# Patient Record
Sex: FEMALE | Race: WHITE | NOT HISPANIC OR LATINO | Employment: OTHER | URBAN - METROPOLITAN AREA
[De-identification: names, ages, dates, MRNs, and addresses within clinical notes are randomized per-mention and may not be internally consistent; named-entity substitution may affect disease eponyms.]

---

## 2017-10-25 ENCOUNTER — TRANSCRIBE ORDERS (OUTPATIENT)
Dept: ADMINISTRATIVE | Facility: HOSPITAL | Age: 57
End: 2017-10-25

## 2017-10-25 DIAGNOSIS — Z12.31 VISIT FOR SCREENING MAMMOGRAM: Primary | ICD-10-CM

## 2017-10-27 ENCOUNTER — HOSPITAL ENCOUNTER (OUTPATIENT)
Dept: MAMMOGRAPHY | Facility: HOSPITAL | Age: 57
Discharge: HOME | End: 2017-10-27
Admitting: FAMILY MEDICINE

## 2017-10-27 DIAGNOSIS — Z12.31 VISIT FOR SCREENING MAMMOGRAM: ICD-10-CM

## 2017-10-27 PROCEDURE — G0202 SCR MAMMO BI INCL CAD: HCPCS

## 2018-11-01 ENCOUNTER — TRANSCRIBE ORDERS (OUTPATIENT)
Dept: ADMINISTRATIVE | Facility: HOSPITAL | Age: 58
End: 2018-11-01

## 2018-11-01 DIAGNOSIS — Z12.39 SCREENING BREAST EXAMINATION: Primary | ICD-10-CM

## 2018-11-02 ENCOUNTER — HOSPITAL ENCOUNTER (OUTPATIENT)
Dept: MAMMOGRAPHY | Facility: HOSPITAL | Age: 58
Discharge: HOME | End: 2018-11-02
Admitting: FAMILY MEDICINE

## 2018-11-02 DIAGNOSIS — Z12.39 SCREENING BREAST EXAMINATION: ICD-10-CM

## 2018-11-02 PROCEDURE — 77067 SCR MAMMO BI INCL CAD: CPT

## 2019-09-10 NOTE — PROGRESS NOTES
Subjective   Chief Complaint   Patient presents with   • Annual Exam       History of Present Illness     60 yo wf presents as a new patient to establish care and have her physical. She is a former patient of Dr. Cabello but she is now out of network.     She has been well and is without complaints. She uses Nasacort as needed which is typically when she is outside all day; she has her allergy shot every five weeks. She has seen Dr. Smith for this for years.     She reports that she has had her C-scope; she would prefer Cologuard as she was sick for days with C-scope. She also has her P/P and BR exam with Dr. Cabello. She denies abnormal PAP; her last one was 2 years ago. She reports that her tetanus is UTD and she has had both Havrix and Zostavax.     She has a history of narrow angle glaucoma for which she had iridotomy with Dr. Samspon 3 years ago. She sees her dentist Dr. Barrera regularly.      There is no problem list on file for this patient.      No Known Allergies    Current Outpatient Medications on File Prior to Visit   Medication Sig Dispense Refill   • glucosamine-chondroitin 500-400 MG capsule capsule Take 1 capsule by mouth Daily.     • guaiFENesin (MUCINEX) 600 MG 12 hr tablet Take 1,200 mg by mouth 2 (Two) Times a Day.     • Multiple Vitamins-Minerals (WOMENS MULTI VITAMIN & MINERAL PO) Take 1 tablet by mouth Daily.     • Multiple Vitamins-Minerals (ZINC PO) Take 1 capsule by mouth Daily. During the winter cheilitis.     • PSEUDOEPHEDRINE HCL ER PO Take 120 mg by mouth Every 12 (Twelve) Hours.     • Triamcinolone Acetonide (NASACORT ALLERGY 24HR) 55 MCG/ACT nasal inhaler 2 sprays into each nostril Daily.     • TURMERIC PO Take 1 capsule by mouth Daily.     • VITAMIN E PO Take 1 capsule by mouth Daily. Uses only in the winter for cheilitis.       No current facility-administered medications on file prior to visit.        Past Medical History:   Diagnosis Date   • Allergic    • Seasonal allergies   "      Family History   Problem Relation Age of Onset   • Hypertension Mother    • Diabetes Father        Social History     Socioeconomic History   • Marital status: Single     Spouse name: Not on file   • Number of children: Not on file   • Years of education: Not on file   • Highest education level: Not on file   Tobacco Use   • Smoking status: Never Smoker   • Smokeless tobacco: Never Used   Substance and Sexual Activity   • Alcohol use: No   • Drug use: Defer   • Sexual activity: Defer       Past Surgical History:   Procedure Laterality Date   • ANKLE SURGERY     • COLONOSCOPY     • EYE PERIPHERAL IRIDOTOMY Bilateral 2016    Dr. Stout Sampson   • FINGER SURGERY         The following portions of the patient's history were reviewed and updated as appropriate: problem list, allergies, current medications, past medical history, past family history, past social history and past surgical history.    Review of Systems   Constitutional: Negative for fatigue.   HENT: Negative for congestion.    Eyes: Negative for visual disturbance.   Respiratory: Negative for shortness of breath.    Cardiovascular: Negative for chest pain.   Gastrointestinal: Negative for blood in stool.   Endocrine: Negative.    Genitourinary: Negative.    Musculoskeletal: Negative for arthralgias.        Pain in right thumb and right hand.    Skin: Negative for rash.   Allergic/Immunologic: Negative for environmental allergies.   Neurological: Negative for headache.   Hematological: Does not bruise/bleed easily.   Psychiatric/Behavioral: The patient is not nervous/anxious.        Immunization History   Administered Date(s) Administered   • Hepatitis A 04/15/2018, 10/15/2018   • Tdap 10/01/2015   • Zostavax 10/01/2016       Objective   Vitals:    09/12/19 0958 09/12/19 1042   BP:  120/80   Pulse:  72   Resp:  12   Weight: 75.3 kg (166 lb)    Height: 170.2 cm (67\")      Physical Exam   Constitutional: She is oriented to person, place, and time. She " appears well-developed and well-nourished.   HENT:   Head: Normocephalic and atraumatic.   Right Ear: External ear normal.   Left Ear: External ear normal.   Nose: Nose normal.   Mouth/Throat: Oropharynx is clear and moist.   Eyes: Conjunctivae and EOM are normal. Pupils are equal, round, and reactive to light. No scleral icterus.   Neck: Neck supple. Carotid bruit is not present. No thyromegaly present.   Cardiovascular: Normal rate, regular rhythm, normal heart sounds and intact distal pulses.   No murmur heard.  Pulmonary/Chest: Effort normal and breath sounds normal.   Abdominal: Soft. Bowel sounds are normal. She exhibits no distension and no mass. There is no hepatosplenomegaly. There is no tenderness. There is no rebound.   Musculoskeletal:   Ambulates without assistance; no clubbing, cyanosis or edema.    Lymphadenopathy:     She has no cervical adenopathy.   Neurological: She is alert and oriented to person, place, and time.   Skin: Skin is warm and dry.   Psychiatric: She has a normal mood and affect.   Vitals reviewed.      Procedures    Assessment/Plan   Yun was seen today for annual exam.    Diagnoses and all orders for this visit:    Annual physical exam  Comments:  Will schedule P/P after I have reviewed Dr. Cabello's records. Flu shot today. She exercises regularly.     Breast cancer screening  Comments:  Due in November. SBE monthly.   Orders:  -     Mammo Screening Bilateral With CAD; Future    Need for immunization against influenza  Comments:  Today.   Orders:  -     Flucelvax Quad=>4Years ([id number])    Pain of right thumb  Comments:  New problem; imaging showed arthritic changes. Will have her see Dr. Healy.   Orders:  -     Ambulatory Referral to Hand Surgery    Right hand pain  Comments:  New problem; imaging showed arthritic changes. Will have her see Dr. Healy.  Orders:  -     Ambulatory Referral to Hand Surgery    Healthcare maintenance  Comments:  CMP and FLP today.   Orders:  -      Comprehensive Metabolic Panel  -     Lipid Panel With / Chol / HDL Ratio    Records requested from Dr. Cabello.     Have reviewed labs dated 09/13/18 patient has brought with her today as well as her last MMG from 11/2018.     Return in about 1 year (around 9/12/2020).

## 2019-09-12 ENCOUNTER — OFFICE VISIT (OUTPATIENT)
Dept: INTERNAL MEDICINE | Facility: CLINIC | Age: 59
End: 2019-09-12

## 2019-09-12 VITALS
RESPIRATION RATE: 12 BRPM | BODY MASS INDEX: 26.06 KG/M2 | HEART RATE: 72 BPM | DIASTOLIC BLOOD PRESSURE: 80 MMHG | SYSTOLIC BLOOD PRESSURE: 120 MMHG | HEIGHT: 67 IN | WEIGHT: 166 LBS

## 2019-09-12 DIAGNOSIS — Z00.00 HEALTHCARE MAINTENANCE: ICD-10-CM

## 2019-09-12 DIAGNOSIS — M79.644 PAIN OF RIGHT THUMB: ICD-10-CM

## 2019-09-12 DIAGNOSIS — Z00.00 ANNUAL PHYSICAL EXAM: Primary | ICD-10-CM

## 2019-09-12 DIAGNOSIS — Z23 NEED FOR IMMUNIZATION AGAINST INFLUENZA: ICD-10-CM

## 2019-09-12 DIAGNOSIS — Z12.39 BREAST CANCER SCREENING: ICD-10-CM

## 2019-09-12 DIAGNOSIS — M79.641 RIGHT HAND PAIN: ICD-10-CM

## 2019-09-12 LAB
ALBUMIN SERPL-MCNC: 5 G/DL (ref 3.5–5.2)
ALBUMIN/GLOB SERPL: 1.9 G/DL
ALP SERPL-CCNC: 101 U/L (ref 39–117)
ALT SERPL-CCNC: 18 U/L (ref 1–33)
AST SERPL-CCNC: 25 U/L (ref 1–32)
BILIRUB SERPL-MCNC: 0.4 MG/DL (ref 0.2–1.2)
BUN SERPL-MCNC: 15 MG/DL (ref 8–23)
BUN/CREAT SERPL: 17.6 (ref 7–25)
CALCIUM SERPL-MCNC: 9.8 MG/DL (ref 8.6–10.5)
CHLORIDE SERPL-SCNC: 99 MMOL/L (ref 98–107)
CHOLEST SERPL-MCNC: 204 MG/DL (ref 0–200)
CHOLEST/HDLC SERPL: 2.43 {RATIO}
CO2 SERPL-SCNC: 28.1 MMOL/L (ref 22–29)
CREAT SERPL-MCNC: 0.85 MG/DL (ref 0.57–1)
GLOBULIN SER CALC-MCNC: 2.7 GM/DL
GLUCOSE SERPL-MCNC: 101 MG/DL (ref 65–99)
HDLC SERPL-MCNC: 84 MG/DL (ref 40–60)
LDLC SERPL CALC-MCNC: 97 MG/DL (ref 0–100)
POTASSIUM SERPL-SCNC: 4.5 MMOL/L (ref 3.5–5.2)
PROT SERPL-MCNC: 7.7 G/DL (ref 6–8.5)
SODIUM SERPL-SCNC: 140 MMOL/L (ref 136–145)
TRIGL SERPL-MCNC: 116 MG/DL (ref 0–150)
VLDLC SERPL CALC-MCNC: 23.2 MG/DL

## 2019-09-12 PROCEDURE — 90674 CCIIV4 VAC NO PRSV 0.5 ML IM: CPT | Performed by: NURSE PRACTITIONER

## 2019-09-12 PROCEDURE — 90471 IMMUNIZATION ADMIN: CPT | Performed by: NURSE PRACTITIONER

## 2019-09-12 PROCEDURE — 99386 PREV VISIT NEW AGE 40-64: CPT | Performed by: NURSE PRACTITIONER

## 2019-09-12 RX ORDER — SODIUM PHOSPHATE,MONO-DIBASIC 19G-7G/118
1 ENEMA (ML) RECTAL DAILY
COMMUNITY

## 2019-09-12 RX ORDER — GUAIFENESIN 600 MG/1
1200 TABLET, EXTENDED RELEASE ORAL 2 TIMES DAILY
COMMUNITY

## 2019-11-04 ENCOUNTER — HOSPITAL ENCOUNTER (OUTPATIENT)
Dept: MAMMOGRAPHY | Facility: HOSPITAL | Age: 59
Discharge: HOME | End: 2019-11-04
Admitting: NURSE PRACTITIONER

## 2019-11-04 DIAGNOSIS — Z12.39 BREAST CANCER SCREENING: ICD-10-CM

## 2019-11-04 PROCEDURE — 77067 SCR MAMMO BI INCL CAD: CPT

## 2020-09-10 DIAGNOSIS — Z00.00 HEALTHCARE MAINTENANCE: Primary | ICD-10-CM

## 2020-09-11 DIAGNOSIS — Z00.00 HEALTHCARE MAINTENANCE: Primary | ICD-10-CM

## 2020-09-12 LAB
ALBUMIN SERPL-MCNC: 4.9 G/DL (ref 3.5–5.2)
ALBUMIN/GLOB SERPL: 2.5 G/DL
ALP SERPL-CCNC: 106 U/L (ref 39–117)
ALT SERPL-CCNC: 20 U/L (ref 1–33)
AST SERPL-CCNC: 24 U/L (ref 1–32)
BILIRUB SERPL-MCNC: 0.5 MG/DL (ref 0–1.2)
BUN SERPL-MCNC: 17 MG/DL (ref 8–23)
BUN/CREAT SERPL: 21 (ref 7–25)
CALCIUM SERPL-MCNC: 9.5 MG/DL (ref 8.6–10.5)
CHLORIDE SERPL-SCNC: 103 MMOL/L (ref 98–107)
CHOLEST SERPL-MCNC: 211 MG/DL (ref 0–200)
CHOLEST/HDLC SERPL: 2.37 {RATIO}
CO2 SERPL-SCNC: 27.7 MMOL/L (ref 22–29)
CREAT SERPL-MCNC: 0.81 MG/DL (ref 0.57–1)
GLOBULIN SER CALC-MCNC: 2 GM/DL
GLUCOSE SERPL-MCNC: 96 MG/DL (ref 65–99)
HCV AB S/CO SERPL IA: 0.1 S/CO RATIO (ref 0–0.9)
HDLC SERPL-MCNC: 89 MG/DL (ref 40–60)
LDLC SERPL CALC-MCNC: 108 MG/DL (ref 0–100)
POTASSIUM SERPL-SCNC: 4.2 MMOL/L (ref 3.5–5.2)
PROT SERPL-MCNC: 6.9 G/DL (ref 6–8.5)
SODIUM SERPL-SCNC: 140 MMOL/L (ref 136–145)
TRIGL SERPL-MCNC: 70 MG/DL (ref 0–150)
VLDLC SERPL CALC-MCNC: 14 MG/DL

## 2020-09-18 ENCOUNTER — RESULTS ENCOUNTER (OUTPATIENT)
Dept: INTERNAL MEDICINE | Facility: CLINIC | Age: 60
End: 2020-09-18

## 2020-09-18 ENCOUNTER — OFFICE VISIT (OUTPATIENT)
Dept: INTERNAL MEDICINE | Facility: CLINIC | Age: 60
End: 2020-09-18

## 2020-09-18 VITALS
SYSTOLIC BLOOD PRESSURE: 112 MMHG | WEIGHT: 161 LBS | TEMPERATURE: 98 F | DIASTOLIC BLOOD PRESSURE: 70 MMHG | RESPIRATION RATE: 12 BRPM | BODY MASS INDEX: 25.27 KG/M2 | HEART RATE: 80 BPM | HEIGHT: 67 IN

## 2020-09-18 DIAGNOSIS — Z00.00 ANNUAL PHYSICAL EXAM: Primary | ICD-10-CM

## 2020-09-18 DIAGNOSIS — Z23 NEED FOR INFLUENZA VACCINATION: Primary | ICD-10-CM

## 2020-09-18 DIAGNOSIS — Z12.39 BREAST CANCER SCREENING: ICD-10-CM

## 2020-09-18 DIAGNOSIS — Z00.00 HEALTHCARE MAINTENANCE: ICD-10-CM

## 2020-09-18 DIAGNOSIS — Z12.11 COLON CANCER SCREENING: ICD-10-CM

## 2020-09-18 PROCEDURE — 99396 PREV VISIT EST AGE 40-64: CPT | Performed by: NURSE PRACTITIONER

## 2020-09-18 PROCEDURE — 90686 IIV4 VACC NO PRSV 0.5 ML IM: CPT | Performed by: NURSE PRACTITIONER

## 2020-09-18 PROCEDURE — 90471 IMMUNIZATION ADMIN: CPT | Performed by: NURSE PRACTITIONER

## 2020-09-18 RX ORDER — ZINC GLUCONATE 50 MG
TABLET ORAL
COMMUNITY

## 2020-09-18 NOTE — PROGRESS NOTES
Subjective   Chief Complaint   Patient presents with   • Annual Exam       History of Present Illness   60 y.o. female presents for annual physical. Feeling well without complaints. Has allergy shots every 5 weeks now with Dr. Dempsey.     Had a colonoscopy in the past at [facility] but not sure when or with whom. Reports it was negative. Interested in Cologard. Denies FH CRC. No hematochezia or melena.      Last P/P with Dr. Cabello in the past--PAPs have always been normal.     Physically active during the year.     Sees Dr. Sampson annually regarding her glaucoma.      There is no problem list on file for this patient.      No Known Allergies    Current Outpatient Medications on File Prior to Visit   Medication Sig Dispense Refill   • glucosamine-chondroitin 500-400 MG capsule capsule Take 1 capsule by mouth Daily.     • guaiFENesin (MUCINEX) 600 MG 12 hr tablet Take 1,200 mg by mouth 2 (Two) Times a Day.     • Multiple Vitamins-Minerals (WOMENS MULTI VITAMIN & MINERAL PO) Take 1 tablet by mouth Daily.     • Multiple Vitamins-Minerals (ZINC PO) Take 1 capsule by mouth Daily. During the winter cheilitis.     • PSEUDOEPHEDRINE HCL ER PO Take 120 mg by mouth Every 12 (Twelve) Hours.     • Triamcinolone Acetonide (NASACORT ALLERGY 24HR) 55 MCG/ACT nasal inhaler 2 sprays into each nostril Daily.     • TURMERIC PO Take 1 capsule by mouth Daily.     • VITAMIN E PO Take 1 capsule by mouth Daily. Uses only in the winter for cheilitis.     • Zinc 50 MG tablet Take  by mouth.       No current facility-administered medications on file prior to visit.        Past Medical History:   Diagnosis Date   • Allergic    • Seasonal allergies        Family History   Problem Relation Age of Onset   • Hypertension Mother    • Diabetes Father    • Breast cancer Neg Hx        Social History     Socioeconomic History   • Marital status: Single     Spouse name: Not on file   • Number of children: Not on file   • Years of education: Not on file   •  "Highest education level: Not on file   Tobacco Use   • Smoking status: Never Smoker   • Smokeless tobacco: Never Used   Substance and Sexual Activity   • Alcohol use: No   • Drug use: Not Currently   • Sexual activity: Not Currently       Past Surgical History:   Procedure Laterality Date   • ANKLE SURGERY     • COLONOSCOPY     • EYE PERIPHERAL IRIDOTOMY Bilateral 2016    Dr. Girish Sampson   • FINGER SURGERY         The following portions of the patient's history were reviewed and updated as appropriate: problem list, allergies, current medications, past medical history, past family history, past social history and past surgical history.    Review of Systems   Constitutional: Negative for fatigue.   HENT: Negative for congestion.    Eyes: Negative for visual disturbance.   Respiratory: Negative for shortness of breath.    Cardiovascular: Negative for chest pain.   Gastrointestinal: Negative for blood in stool.   Endocrine: Negative.    Genitourinary: Negative.    Musculoskeletal: Negative for arthralgias.   Skin: Negative for rash.   Allergic/Immunologic: Negative for environmental allergies.   Neurological: Negative for headache.   Hematological: Does not bruise/bleed easily.   Psychiatric/Behavioral: The patient is not nervous/anxious.        Immunization History   Administered Date(s) Administered   • Flulaval/Fluarix Quad 09/18/2020   • Hep A, 2 Dose 03/15/2018, 09/18/2018   • Hepatitis A 04/15/2018, 10/15/2018   • Influenza, Unspecified 09/12/2019   • Shingrix 10/17/2019, 12/22/2019   • Tdap 10/01/2015   • Zostavax 10/01/2016   • flucelvax quad pfs =>4 YRS 09/12/2019       Objective   Vitals:    09/18/20 0903 09/18/20 0930   BP:  112/70   Pulse:  80   Resp:  12   Temp: 98 °F (36.7 °C)    Weight: 73 kg (161 lb)    Height: 170.2 cm (67\")      Body mass index is 25.22 kg/m².  Physical Exam  Vitals signs reviewed.   Constitutional:       Appearance: Normal appearance. She is well-developed and normal weight.   HENT: "      Head: Normocephalic and atraumatic.      Right Ear: Tympanic membrane, ear canal and external ear normal.      Left Ear: Tympanic membrane, ear canal and external ear normal.      Nose: Nose normal.      Mouth/Throat:      Mouth: Mucous membranes are moist.      Pharynx: Oropharynx is clear. No posterior oropharyngeal erythema.   Eyes:      General: No scleral icterus.     Extraocular Movements: Extraocular movements intact.      Conjunctiva/sclera: Conjunctivae normal.      Pupils: Pupils are equal, round, and reactive to light.   Neck:      Musculoskeletal: Neck supple.      Thyroid: No thyromegaly.      Vascular: No carotid bruit.   Cardiovascular:      Rate and Rhythm: Normal rate and regular rhythm.      Pulses: Normal pulses.      Heart sounds: Normal heart sounds. No murmur.   Pulmonary:      Effort: Pulmonary effort is normal.      Breath sounds: Normal breath sounds.   Abdominal:      General: Abdomen is flat. Bowel sounds are normal. There is no distension.      Palpations: Abdomen is soft. There is no mass.      Tenderness: There is no abdominal tenderness. There is no rebound.   Musculoskeletal:      Comments: Ambulates without assistance; no clubbing, cyanosis or edema.    Lymphadenopathy:      Cervical: No cervical adenopathy.   Skin:     General: Skin is warm and dry.   Neurological:      Mental Status: She is alert. Mental status is at baseline.   Psychiatric:         Mood and Affect: Mood normal.         Behavior: Behavior normal.         Procedures    Assessment/Plan   Yun was seen today for annual exam.    Diagnoses and all orders for this visit:    Annual physical exam  Comments:  Flu shot today. Weight loss via dietary changes and 150 minutes aerobic activity.   Orders:  -     Comprehensive Metabolic Panel; Future  -     Lipid Panel With / Chol / HDL Ratio; Future    Breast cancer screening  -     Mammo Screening Bilateral With CAD; Future    Colon cancer screening  Comments:  Prefers  COlogard. Will obtain copy of last colonoscopy.   Orders:  -     Cologuard - Stool, Per Rectum; Future    Healthcare maintenance  Comments:  Schedule P/P.     Records reviewed include previous OV with myself as well as labs.     Return in about 6 months (around 3/18/2021) for pap/pelvic.

## 2020-10-06 ENCOUNTER — TELEPHONE (OUTPATIENT)
Dept: INTERNAL MEDICINE | Facility: CLINIC | Age: 60
End: 2020-10-06

## 2020-10-06 NOTE — TELEPHONE ENCOUNTER
Can you scan it in and send it to me so I can call patient? I believe it but want to confirm before we call her with it.

## 2020-10-07 DIAGNOSIS — R19.5 POSITIVE COLORECTAL CANCER SCREENING USING COLOGUARD TEST: Primary | ICD-10-CM

## 2020-10-08 ENCOUNTER — TELEPHONE (OUTPATIENT)
Dept: INTERNAL MEDICINE | Facility: CLINIC | Age: 60
End: 2020-10-08

## 2020-10-08 NOTE — TELEPHONE ENCOUNTER
Patient called in and stated she wanted to know were she got her last colonoscopy . Patient thought it was at [facility] but it was at [facility] on 4/23/2012  And it was normal .             Patient call back [phone]

## 2020-10-08 NOTE — TELEPHONE ENCOUNTER
JENELLEI      PATIENT CALLED WITH ADDITIONAL INFO RE: PREVIOUS COLONOSCOPY    DR WAS DR PALACIO  USED MOBI PREP

## 2020-10-14 NOTE — PROGRESS NOTES
GI Problem (Cologuard testing came back positive )      HPI  Patient here for consultation regarding an abnormal Cologuard test.    She had a colonoscopy in 2012 that as normal. She denies family history of colon cancer.    She denies change in bowel habits, change in stool caliber, or rectal bleeding.    Denies prior abdominal surgeries.    Review of Systems   Constitutional: Negative for appetite change, chills, diaphoresis, fatigue, fever and unexpected weight change.   HENT: Negative for dental problem, ear pain, mouth sores, rhinorrhea, sore throat and voice change.    Eyes: Negative for pain, redness and visual disturbance.   Respiratory: Negative for cough, chest tightness and wheezing.    Cardiovascular: Negative for chest pain, palpitations and leg swelling.   Endocrine: Negative for cold intolerance, heat intolerance, polydipsia, polyphagia and polyuria.   Genitourinary: Negative for dysuria, frequency, hematuria and urgency.   Musculoskeletal: Negative for arthralgias, back pain, joint swelling, myalgias and neck pain.   Skin: Negative for rash.   Allergic/Immunologic: Negative for environmental allergies, food allergies and immunocompromised state.   Neurological: Negative for dizziness, seizures, weakness, numbness and headaches.   Hematological: Does not bruise/bleed easily.   Psychiatric/Behavioral: Negative for sleep disturbance. The patient is not nervous/anxious.         I have reviewed and confirmed the accuracy of the HPI and ROS as documented by the APRN ROSARIO Martinez     Problem List:  There is no problem list on file for this patient.      Medical History:    Past Medical History:   Diagnosis Date   • Allergic    • Seasonal allergies         Social History:    Social History     Socioeconomic History   • Marital status: Single     Spouse name: Not on file   • Number of children: Not on file   • Years of education: Not on file   • Highest education level: Not on file   Tobacco Use   •  Smoking status: Never Smoker   • Smokeless tobacco: Never Used   Substance and Sexual Activity   • Alcohol use: No   • Drug use: Not Currently   • Sexual activity: Not Currently       Family History:   Family History   Problem Relation Age of Onset   • Hypertension Mother    • Diabetes Father    • Breast cancer Neg Hx        Surgical History:   Past Surgical History:   Procedure Laterality Date   • ANKLE SURGERY     • COLONOSCOPY     • EYE PERIPHERAL IRIDOTOMY Bilateral 2016    Dr. Girish Sampson   • FINGER SURGERY           Current Outpatient Medications:   •  GINKGO BILOBA COMPLEX PO, Take  by mouth., Disp: , Rfl:   •  glucosamine-chondroitin 500-400 MG capsule capsule, Take 1 capsule by mouth Daily., Disp: , Rfl:   •  guaiFENesin (MUCINEX) 600 MG 12 hr tablet, Take 1,200 mg by mouth 2 (Two) Times a Day., Disp: , Rfl:   •  Multiple Vitamins-Minerals (WOMENS MULTI VITAMIN & MINERAL PO), Take 1 tablet by mouth Daily., Disp: , Rfl:   •  Multiple Vitamins-Minerals (ZINC PO), Take 1 capsule by mouth Daily. During the winter cheilitis., Disp: , Rfl:   •  PSEUDOEPHEDRINE HCL ER PO, Take 120 mg by mouth Every 12 (Twelve) Hours., Disp: , Rfl:   •  Triamcinolone Acetonide (NASACORT ALLERGY 24HR) 55 MCG/ACT nasal inhaler, 2 sprays into each nostril Daily., Disp: , Rfl:   •  TURMERIC PO, Take 1 capsule by mouth Daily., Disp: , Rfl:   •  VITAMIN E PO, Take 1 capsule by mouth Daily. Uses only in the winter for cheilitis., Disp: , Rfl:   •  Zinc 50 MG tablet, Take  by mouth., Disp: , Rfl:     Allergies: No Known Allergies     The following portions of the patient's history were reviewed and updated as appropriate: allergies, current medications, past family history, past medical history, past social history, past surgical history and problem list.    Vitals:    10/15/20 1324   BP: 140/74   Pulse: 84   Temp: 97.7 °F (36.5 °C)   SpO2: 95%         10/15/20  1324   Weight: 73 kg (161 lb)     Body mass index is 25.21  kg/m².      PHYSICAL EXAM:  Physical Exam  Vitals signs reviewed.   Constitutional:       Appearance: Normal appearance. She is well-developed.   HENT:      Nose: Nose normal. No nasal deformity.   Eyes:      General: No scleral icterus.  Neck:      Trachea: No tracheal deviation.   Pulmonary:      Effort: Pulmonary effort is normal. No respiratory distress.      Breath sounds: Normal breath sounds.   Abdominal:      General: Bowel sounds are normal. There is no distension.      Palpations: Abdomen is soft. Abdomen is not rigid. There is no shifting dullness.      Tenderness: There is no abdominal tenderness. There is no guarding or rebound.      Hernia: No hernia is present.   Lymphadenopathy:      Comments: No periumbilical lymphadenopathy   Skin:     General: Skin is warm.   Neurological:      Mental Status: She is alert.   Psychiatric:         Behavior: Behavior normal.             Assessment/ Plan  Diagnoses and all orders for this visit:    1. Positive colorectal cancer screening using Cologuard test (Primary)    2. Encounter for screening for malignant neoplasm of colon         No follow-ups on file.    Patient Instructions   Recommend colonoscopy for further evaluation of positive Cologuard.          Discussion:  In for consultation regarding positive Cologuard.  We arrange for colonoscopy for further evaluation.

## 2020-10-15 ENCOUNTER — OFFICE VISIT (OUTPATIENT)
Dept: GASTROENTEROLOGY | Facility: CLINIC | Age: 60
End: 2020-10-15

## 2020-10-15 ENCOUNTER — PREP FOR SURGERY (OUTPATIENT)
Dept: OTHER | Facility: HOSPITAL | Age: 60
End: 2020-10-15

## 2020-10-15 VITALS
DIASTOLIC BLOOD PRESSURE: 74 MMHG | HEIGHT: 67 IN | SYSTOLIC BLOOD PRESSURE: 140 MMHG | WEIGHT: 161 LBS | HEART RATE: 84 BPM | OXYGEN SATURATION: 95 % | TEMPERATURE: 97.7 F | BODY MASS INDEX: 25.27 KG/M2

## 2020-10-15 DIAGNOSIS — R19.5 POSITIVE COLORECTAL CANCER SCREENING USING COLOGUARD TEST: Primary | ICD-10-CM

## 2020-10-15 DIAGNOSIS — Z12.11 ENCOUNTER FOR SCREENING FOR MALIGNANT NEOPLASM OF COLON: ICD-10-CM

## 2020-10-15 PROCEDURE — 99203 OFFICE O/P NEW LOW 30 MIN: CPT | Performed by: INTERNAL MEDICINE

## 2020-10-21 ENCOUNTER — TELEPHONE (OUTPATIENT)
Dept: GASTROENTEROLOGY | Facility: CLINIC | Age: 60
End: 2020-10-21

## 2020-11-05 ENCOUNTER — HOSPITAL ENCOUNTER (OUTPATIENT)
Dept: MAMMOGRAPHY | Facility: HOSPITAL | Age: 60
Discharge: HOME | End: 2020-11-05
Admitting: NURSE PRACTITIONER

## 2020-11-05 DIAGNOSIS — Z12.39 BREAST CANCER SCREENING: ICD-10-CM

## 2020-11-05 PROCEDURE — 77067 SCR MAMMO BI INCL CAD: CPT

## 2020-11-05 PROCEDURE — 77063 BREAST TOMOSYNTHESIS BI: CPT

## 2020-11-09 ENCOUNTER — LAB REQUISITION (OUTPATIENT)
Dept: LAB | Facility: HOSPITAL | Age: 60
End: 2020-11-09

## 2020-11-09 DIAGNOSIS — Z00.00 ENCOUNTER FOR GENERAL ADULT MEDICAL EXAMINATION WITHOUT ABNORMAL FINDINGS: ICD-10-CM

## 2020-11-09 PROCEDURE — U0004 COV-19 TEST NON-CDC HGH THRU: HCPCS | Performed by: INTERNAL MEDICINE

## 2020-11-10 LAB — SARS-COV-2 RNA RESP QL NAA+PROBE: NOT DETECTED

## 2020-11-11 ENCOUNTER — OUTSIDE FACILITY SERVICE (OUTPATIENT)
Dept: GASTROENTEROLOGY | Facility: CLINIC | Age: 60
End: 2020-11-11

## 2020-11-11 ENCOUNTER — LAB REQUISITION (OUTPATIENT)
Dept: LAB | Facility: HOSPITAL | Age: 60
End: 2020-11-11

## 2020-11-11 DIAGNOSIS — Z12.11 ENCOUNTER FOR SCREENING FOR MALIGNANT NEOPLASM OF COLON: ICD-10-CM

## 2020-11-11 PROCEDURE — 88305 TISSUE EXAM BY PATHOLOGIST: CPT | Performed by: INTERNAL MEDICINE

## 2020-11-11 PROCEDURE — 45385 COLONOSCOPY W/LESION REMOVAL: CPT | Performed by: INTERNAL MEDICINE

## 2020-11-11 PROCEDURE — 45380 COLONOSCOPY AND BIOPSY: CPT | Performed by: INTERNAL MEDICINE

## 2020-11-12 LAB
LAB AP CASE REPORT: NORMAL
LAB AP CLINICAL INFORMATION: NORMAL
PATH REPORT.FINAL DX SPEC: NORMAL
PATH REPORT.GROSS SPEC: NORMAL

## 2021-03-11 ENCOUNTER — IMMUNIZATION (OUTPATIENT)
Dept: VACCINE CLINIC | Facility: HOSPITAL | Age: 61
End: 2021-03-11

## 2021-03-11 PROCEDURE — 91300 HC SARSCOV02 VAC 30MCG/0.3ML IM: CPT | Performed by: INTERNAL MEDICINE

## 2021-03-11 PROCEDURE — 0001A: CPT | Performed by: INTERNAL MEDICINE

## 2021-03-17 NOTE — PROGRESS NOTES
Subjective   Chief Complaint   Patient presents with   • Gynecologic Exam       History of Present Illness   61 y.o. female presents for annual cervical cancer screening with CBE. Feeling good without complaints. No BTB. . One abnormal PAP in the past but repeat normal. No personal or FH GYN CA. Denies FH BRCA.  MMG 2020 negative--repeat 1 year.     Colonoscopy with Dr. Manning in November revealed tubular adenoma--recall 5 years.      Patient Active Problem List   Diagnosis   • Adenomatous polyp of colon       No Known Allergies    Current Outpatient Medications on File Prior to Visit   Medication Sig Dispense Refill   • GINKGO BILOBA COMPLEX PO Take  by mouth.     • glucosamine-chondroitin 500-400 MG capsule capsule Take 1 capsule by mouth Daily.     • guaiFENesin (MUCINEX) 600 MG 12 hr tablet Take 1,200 mg by mouth 2 (Two) Times a Day.     • Multiple Vitamins-Minerals (WOMENS MULTI VITAMIN & MINERAL PO) Take 1 tablet by mouth Daily.     • Triamcinolone Acetonide (NASACORT ALLERGY 24HR) 55 MCG/ACT nasal inhaler 2 sprays into each nostril Daily.     • TURMERIC PO Take 1 capsule by mouth Daily.     • VITAMIN E PO Take 1 capsule by mouth Daily. Uses only in the winter for cheilitis.     • Zinc 50 MG tablet Take  by mouth.     • Multiple Vitamins-Minerals (ZINC PO) Take 1 capsule by mouth Daily. During the winter cheilitis.     • PSEUDOEPHEDRINE HCL ER PO Take 120 mg by mouth Every 12 (Twelve) Hours.       No current facility-administered medications on file prior to visit.       Past Medical History:   Diagnosis Date   • Adenomatous polyp of colon 3/19/2021   • Allergic    • Seasonal allergies        Family History   Problem Relation Age of Onset   • Hypertension Mother    • Diabetes Father    • Breast cancer Neg Hx        Social History     Socioeconomic History   • Marital status: Single     Spouse name: Not on file   • Number of children: Not on file   • Years of education: Not on file   • Highest  "education level: Not on file   Tobacco Use   • Smoking status: Never Smoker   • Smokeless tobacco: Never Used   Substance and Sexual Activity   • Alcohol use: No   • Drug use: Not Currently   • Sexual activity: Not Currently       Past Surgical History:   Procedure Laterality Date   • ANKLE SURGERY     • COLONOSCOPY     • EYE PERIPHERAL IRIDOTOMY Bilateral 2016    Dr. Girish Sampson   • FINGER SURGERY         The following portions of the patient's history were reviewed and updated as appropriate: problem list, allergies, current medications, past medical history, past family history, past social history and past surgical history.    Review of Systems    Immunization History   Administered Date(s) Administered   • COVID-19 (PFIZER) 03/11/2021   • Flulaval/Fluarix/Fluzone Quad 09/18/2020   • Hep A, 2 Dose 03/15/2018, 09/18/2018   • Hepatitis A 04/15/2018, 10/15/2018   • Influenza, Unspecified 09/12/2019   • Shingrix 10/17/2019, 12/22/2019   • Tdap 10/01/2015   • Zostavax 10/01/2016   • flucelvax quad pfs =>4 YRS 09/12/2019       Objective   Vitals:    03/19/21 0851   BP: 114/78   Pulse: 80   Resp: 14   Temp: 97.5 °F (36.4 °C)   Weight: 73.5 kg (162 lb)   Height: 170.2 cm (67\")     Body mass index is 25.37 kg/m².  Physical Exam  Constitutional:       Appearance: Normal appearance. She is normal weight.   HENT:      Head: Normocephalic and atraumatic.   Chest:      Breasts:         Right: Normal. No mass, nipple discharge or skin change.         Left: Normal. No mass, nipple discharge or skin change.   Genitourinary:     Comments: External genitalia without erythema, lesions or masses. Vaginal walls pink and moist.  Cervix pink and nulliparous. PAP obtained. Uterus without masses. No adnexal tenderness or masses.     Neurological:      Mental Status: She is alert.         Procedures    Assessment/Plan   Diagnoses and all orders for this visit:    1. Encounter for cervical Pap smear with pelvic exam " (Primary)  Comments:  PAP obtained. If negative repeat in 2-3 years. Continue exercise.   Orders:  -     IGP,rfx Aptima HPV All Pth    2. Encounter for screening mammogram for malignant neoplasm of breast  Comments:  Continue SBE. CBE today unremarkable.   Orders:  -     Mammo Screening Digital Tomosynthesis Bilateral With CAD; Future    3. COVID-19 vaccine series started  Comments:  Final vaccination 04/01/2021.     Records reviewed include previous OV with myself as well as labs.     Return for Next scheduled follow up, Lab B4 FUP.

## 2021-03-19 ENCOUNTER — OFFICE VISIT (OUTPATIENT)
Dept: INTERNAL MEDICINE | Facility: CLINIC | Age: 61
End: 2021-03-19

## 2021-03-19 VITALS
TEMPERATURE: 97.5 F | BODY MASS INDEX: 25.43 KG/M2 | WEIGHT: 162 LBS | HEART RATE: 80 BPM | RESPIRATION RATE: 14 BRPM | SYSTOLIC BLOOD PRESSURE: 114 MMHG | HEIGHT: 67 IN | DIASTOLIC BLOOD PRESSURE: 78 MMHG

## 2021-03-19 DIAGNOSIS — Z23 COVID-19 VACCINE SERIES STARTED: ICD-10-CM

## 2021-03-19 DIAGNOSIS — Z01.419 ENCOUNTER FOR CERVICAL PAP SMEAR WITH PELVIC EXAM: Primary | ICD-10-CM

## 2021-03-19 DIAGNOSIS — Z12.31 ENCOUNTER FOR SCREENING MAMMOGRAM FOR MALIGNANT NEOPLASM OF BREAST: ICD-10-CM

## 2021-03-19 PROBLEM — D12.6 ADENOMATOUS POLYP OF COLON: Status: ACTIVE | Noted: 2021-03-19

## 2021-03-19 PROCEDURE — G0101 CA SCREEN;PELVIC/BREAST EXAM: HCPCS | Performed by: NURSE PRACTITIONER

## 2021-03-24 LAB
CONV .: NORMAL
CYTOLOGIST CVX/VAG CYTO: NORMAL
CYTOLOGY CVX/VAG DOC CYTO: NORMAL
CYTOLOGY CVX/VAG DOC THIN PREP: NORMAL
DX ICD CODE: NORMAL
HIV 1 & 2 AB SER-IMP: NORMAL
Lab: NORMAL
OTHER STN SPEC: NORMAL
RECOM F/U CVX/VAG CYTO: NORMAL
STAT OF ADQ CVX/VAG CYTO-IMP: NORMAL

## 2021-04-01 ENCOUNTER — IMMUNIZATION (OUTPATIENT)
Dept: VACCINE CLINIC | Facility: HOSPITAL | Age: 61
End: 2021-04-01

## 2021-04-01 PROCEDURE — 91300 HC SARSCOV02 VAC 30MCG/0.3ML IM: CPT | Performed by: INTERNAL MEDICINE

## 2021-04-01 PROCEDURE — 0002A: CPT | Performed by: INTERNAL MEDICINE

## 2021-09-13 ENCOUNTER — CLINICAL SUPPORT (OUTPATIENT)
Dept: INTERNAL MEDICINE | Facility: CLINIC | Age: 61
End: 2021-09-13

## 2021-09-13 DIAGNOSIS — Z23 NEEDS FLU SHOT: ICD-10-CM

## 2021-09-13 PROCEDURE — 90471 IMMUNIZATION ADMIN: CPT | Performed by: NURSE PRACTITIONER

## 2021-09-13 PROCEDURE — 90686 IIV4 VACC NO PRSV 0.5 ML IM: CPT | Performed by: NURSE PRACTITIONER

## 2021-09-20 ENCOUNTER — OFFICE VISIT (OUTPATIENT)
Dept: INTERNAL MEDICINE | Facility: CLINIC | Age: 61
End: 2021-09-20

## 2021-09-20 VITALS
HEIGHT: 67 IN | SYSTOLIC BLOOD PRESSURE: 110 MMHG | BODY MASS INDEX: 25.9 KG/M2 | HEART RATE: 98 BPM | RESPIRATION RATE: 12 BRPM | TEMPERATURE: 96.8 F | WEIGHT: 165 LBS | DIASTOLIC BLOOD PRESSURE: 74 MMHG

## 2021-09-20 DIAGNOSIS — M25.471 RIGHT ANKLE SWELLING: ICD-10-CM

## 2021-09-20 DIAGNOSIS — Z00.00 ANNUAL PHYSICAL EXAM: Primary | ICD-10-CM

## 2021-09-20 DIAGNOSIS — Z78.0 POST-MENOPAUSAL: ICD-10-CM

## 2021-09-20 PROCEDURE — 99396 PREV VISIT EST AGE 40-64: CPT | Performed by: NURSE PRACTITIONER

## 2021-09-20 RX ORDER — CHLORAL HYDRATE 500 MG
CAPSULE ORAL
COMMUNITY

## 2021-09-20 NOTE — PROGRESS NOTES
Subjective   Chief Complaint   Patient presents with   • Annual Exam       History of Present Illness   61 y.o. female presents for annual physical.     CLS 11/11/2020 with Dr. Manning revealed tubular adenoma; recall 5 years.     Eye exam annually with Dr. Sampson given glaucoma; it has not progressed.     P/P 03/19/2021--advised need repeated due to inadequate specimen but decided she did not want to have this done.. MMG due in November. Dental exam UTD--recently had a crown put on.     After moving 38 bags of [employer] at [employer] noticed some swelling in her right ankle. She has been wearing a brace and it is getting better. No pain. Also twisted her left knee and felt like she developed a Baker's cyst. It has slowly resolved and she can no longer feel it. No swelling or pain in left knee now. Notes this is why she gained a few pounds as she wasn't able to exercise regularly. Plans on starting on her stationary bike and treadmill as the weather changes.     Patient Active Problem List   Diagnosis   • Adenomatous polyp of colon       No Known Allergies    Current Outpatient Medications on File Prior to Visit   Medication Sig Dispense Refill   • GINKGO BILOBA COMPLEX PO Take  by mouth.     • glucosamine-chondroitin 500-400 MG capsule capsule Take 1 capsule by mouth Daily.     • guaiFENesin (MUCINEX) 600 MG 12 hr tablet Take 1,200 mg by mouth 2 (Two) Times a Day.     • Multiple Vitamins-Minerals (WOMENS MULTI VITAMIN & MINERAL PO) Take 1 tablet by mouth Daily.     • Omega-3 Fatty Acids (fish oil) 1000 MG capsule capsule Take  by mouth Daily With Breakfast.     • PSEUDOEPHEDRINE HCL ER PO Take 120 mg by mouth Every 12 (Twelve) Hours.     • Triamcinolone Acetonide (NASACORT ALLERGY 24HR) 55 MCG/ACT nasal inhaler 2 sprays into each nostril Daily.     • TURMERIC PO Take 1 capsule by mouth Daily.     • VITAMIN E PO Take 1 capsule by mouth Daily. Uses only in the winter for cheilitis.     • Multiple Vitamins-Minerals  (ZINC PO) Take 1 capsule by mouth Daily. During the winter cheilitis.     • Zinc 50 MG tablet Take  by mouth.       No current facility-administered medications on file prior to visit.       Past Medical History:   Diagnosis Date   • Adenomatous polyp of colon 3/19/2021   • Seasonal allergies        Family History   Problem Relation Age of Onset   • Hypertension Mother    • Diabetes Father    • Breast cancer Neg Hx        Social History     Socioeconomic History   • Marital status: Single     Spouse name: Not on file   • Number of children: Not on file   • Years of education: Not on file   • Highest education level: Not on file   Tobacco Use   • Smoking status: Never Smoker   • Smokeless tobacco: Never Used   Substance and Sexual Activity   • Alcohol use: No   • Drug use: Not Currently   • Sexual activity: Not Currently       Past Surgical History:   Procedure Laterality Date   • ANKLE SURGERY     • COLONOSCOPY     • EYE PERIPHERAL IRIDOTOMY Bilateral 2016    Dr. Girish Sampson   • FINGER SURGERY         The following portions of the patient's history were reviewed and updated as appropriate: problem list, allergies, current medications, past medical history, past family history, past social history and past surgical history.    Review of Systems   Constitutional: Negative for fatigue.   HENT: Negative for congestion.    Eyes: Negative for visual disturbance.   Respiratory: Negative for shortness of breath.    Cardiovascular: Negative for chest pain.   Gastrointestinal: Negative for blood in stool.   Endocrine: Negative.    Genitourinary: Negative.    Musculoskeletal: Negative for arthralgias.        Slight swelling noted at lateral aspect of right ankle--she notes it is improving.    Skin: Negative for rash.   Allergic/Immunologic: Negative for environmental allergies.   Neurological: Negative for headache.   Hematological: Does not bruise/bleed easily.   Psychiatric/Behavioral: The patient is not nervous/anxious.   "      Immunization History   Administered Date(s) Administered   • COVID-19 (PFIZER) 03/11/2021, 04/01/2021   • FluLaval >6 Months 09/18/2020, 09/13/2021   • Hep A, 2 Dose 03/15/2018, 09/18/2018   • Hepatitis A 04/15/2018, 10/15/2018   • Influenza, Unspecified 09/12/2019   • Shingrix 10/17/2019, 12/22/2019   • Tdap 10/01/2015   • Zostavax 10/01/2016   • flucelvax quad pfs =>4 YRS 09/12/2019       Objective   Vitals:    09/20/21 0901   BP: 110/74   Pulse: 98   Resp: 12   Temp: 96.8 °F (36 °C)   Weight: 74.8 kg (165 lb)   Height: 170.2 cm (67\")     Body mass index is 25.84 kg/m².  Physical Exam  Vitals reviewed.   Constitutional:       Appearance: Normal appearance. She is well-developed.   HENT:      Head: Normocephalic and atraumatic.      Right Ear: Tympanic membrane, ear canal and external ear normal.      Left Ear: Tympanic membrane, ear canal and external ear normal.      Nose: Nose normal.      Mouth/Throat:      Mouth: Mucous membranes are moist.      Pharynx: Oropharynx is clear. No oropharyngeal exudate or posterior oropharyngeal erythema.   Eyes:      General: No scleral icterus.     Extraocular Movements: Extraocular movements intact.      Conjunctiva/sclera: Conjunctivae normal.      Pupils: Pupils are equal, round, and reactive to light.   Neck:      Thyroid: No thyromegaly.   Cardiovascular:      Rate and Rhythm: Normal rate and regular rhythm.      Heart sounds: Normal heart sounds. No murmur heard.     Pulmonary:      Effort: Pulmonary effort is normal.      Breath sounds: Normal breath sounds.   Abdominal:      General: Bowel sounds are normal. There is no distension.      Palpations: Abdomen is soft.      Tenderness: There is no abdominal tenderness.   Musculoskeletal:      Cervical back: Neck supple.      Comments: Ambulates without assistance; no clubbing, cyanosis or edema. Slight swelling noted at R lateral malleolus. FROM.    Lymphadenopathy:      Cervical: No cervical adenopathy.   Skin:     " General: Skin is warm and dry.   Neurological:      Mental Status: She is alert.   Psychiatric:         Mood and Affect: Mood normal.         Behavior: Behavior normal.         Procedures    Assessment/Plan   Diagnoses and all orders for this visit:    1. Annual physical exam (Primary)  Comments:  Weight loss via dietary changes. MMG UTD. DXA advised. LDL up from last year--prefers LSM prior to considering statin.     2. Post-menopausal  -     DEXA Bone Density Axial    3. Right ankle swelling  Comments:  Noted at lateral aspect. Reports it is improving  by wearing her brace. Call should anything change as would consider imaging and PT.     Records reviewed include previous OV with myself as well as labs.     Return in about 1 year (around 9/20/2022) for Lab B4 FUP.

## 2021-11-04 ENCOUNTER — TELEPHONE (OUTPATIENT)
Dept: INTERNAL MEDICINE | Facility: CLINIC | Age: 61
End: 2021-11-04

## 2021-11-04 NOTE — TELEPHONE ENCOUNTER
"PATIENT IS INTERESTED IN GETTING THE COVID BOOSTER BUT IS \"NOT ELIGIBLE\" PER THE PHARMACY.     CALL: [phone]   "

## 2021-11-09 ENCOUNTER — HOSPITAL ENCOUNTER (OUTPATIENT)
Dept: MAMMOGRAPHY | Facility: HOSPITAL | Age: 61
Discharge: HOME | End: 2021-11-09
Admitting: NURSE PRACTITIONER

## 2021-11-09 DIAGNOSIS — Z12.31 ENCOUNTER FOR SCREENING MAMMOGRAM FOR MALIGNANT NEOPLASM OF BREAST: ICD-10-CM

## 2021-11-09 PROCEDURE — 77063 BREAST TOMOSYNTHESIS BI: CPT

## 2021-11-09 PROCEDURE — 77067 SCR MAMMO BI INCL CAD: CPT

## 2022-03-11 ENCOUNTER — APPOINTMENT (OUTPATIENT)
Dept: BONE DENSITY | Facility: HOSPITAL | Age: 62
End: 2022-03-11

## 2022-06-07 ENCOUNTER — HOSPITAL ENCOUNTER (OUTPATIENT)
Dept: BONE DENSITY | Facility: HOSPITAL | Age: 62
Discharge: HOME | End: 2022-06-07
Admitting: NURSE PRACTITIONER

## 2022-06-07 PROCEDURE — 77080 DXA BONE DENSITY AXIAL: CPT

## 2022-09-19 ENCOUNTER — FLU SHOT (OUTPATIENT)
Dept: INTERNAL MEDICINE | Facility: CLINIC | Age: 62
End: 2022-09-19

## 2022-09-19 DIAGNOSIS — Z23 NEED FOR VACCINATION FOR H FLU TYPE B: Primary | ICD-10-CM

## 2022-09-19 PROCEDURE — 90686 IIV4 VACC NO PRSV 0.5 ML IM: CPT | Performed by: NURSE PRACTITIONER

## 2022-09-19 PROCEDURE — 90471 IMMUNIZATION ADMIN: CPT | Performed by: NURSE PRACTITIONER

## 2022-09-20 ENCOUNTER — TRANSCRIBE ORDERS (OUTPATIENT)
Dept: ADMINISTRATIVE | Facility: HOSPITAL | Age: 62
End: 2022-09-20

## 2022-09-20 DIAGNOSIS — Z12.31 VISIT FOR SCREENING MAMMOGRAM: Primary | ICD-10-CM

## 2022-09-22 PROBLEM — E78.2 MODERATE MIXED HYPERLIPIDEMIA NOT REQUIRING STATIN THERAPY: Chronic | Status: ACTIVE | Noted: 2022-09-22

## 2022-09-22 NOTE — PROGRESS NOTES
Subjective   Chief Complaint   Patient presents with   • Annual Exam       History of Present Illness   62 y.o. female presents for annual physical. Feeling good. Working in her yard and bringing her tropical plants and bonsai in with the change of weather. No weight gain. She remains active and walks 45-60 minutes daily. She had her flu shot last week.     CLS UTD with Dr. Manning revealed tubular adenoma; recall 5 years.     Eye exam UTD with Dr. Sampson; she has increased intraocular pressure but has not developed into glaucoma.    Declines P/P.     Patient Active Problem List   Diagnosis   • Adenomatous polyp of colon   • Moderate mixed hyperlipidemia not requiring statin therapy       No Known Allergies    Current Outpatient Medications on File Prior to Visit   Medication Sig Dispense Refill   • GINKGO BILOBA COMPLEX PO Take  by mouth.     • glucosamine-chondroitin 500-400 MG capsule capsule Take 1 capsule by mouth Daily.     • guaiFENesin (MUCINEX) 600 MG 12 hr tablet Take 1,200 mg by mouth 2 (Two) Times a Day. As needed     • Multiple Vitamins-Minerals (WOMENS MULTI VITAMIN & MINERAL PO) Take 1 tablet by mouth Daily.     • Omega-3 Fatty Acids (fish oil) 1000 MG capsule capsule Take  by mouth Daily With Breakfast.     • PSEUDOEPHEDRINE HCL ER PO Take 120 mg by mouth Every 12 (Twelve) Hours.     • Triamcinolone Acetonide (NASACORT) 55 MCG/ACT nasal inhaler 2 sprays into each nostril Daily.     • TURMERIC PO Take 1 capsule by mouth Daily.     • VITAMIN E PO Take 1 capsule by mouth Daily. Uses only in the winter for cheilitis.     • Multiple Vitamins-Minerals (ZINC PO) Take 1 capsule by mouth Daily. During the winter cheilitis.     • Zinc 50 MG tablet Take  by mouth.       No current facility-administered medications on file prior to visit.       Past Medical History:   Diagnosis Date   • Adenomatous polyp of colon 03/19/2021   • Cataract    • Ocular hypertension    • Seasonal allergies        Family History    Problem Relation Age of Onset   • Hypertension Mother    • Diabetes Father    • Breast cancer Neg Hx        Social History     Socioeconomic History   • Marital status: Single   Tobacco Use   • Smoking status: Never Smoker   • Smokeless tobacco: Never Used   Substance and Sexual Activity   • Alcohol use: No   • Drug use: Not Currently   • Sexual activity: Not Currently       Past Surgical History:   Procedure Laterality Date   • ANKLE SURGERY     • COLONOSCOPY     • EYE PERIPHERAL IRIDOTOMY Bilateral 2016    Dr. Girish Sampson   • FINGER SURGERY         The following portions of the patient's history were reviewed and updated as appropriate: problem list, allergies, current medications, past medical history, past family history, past social history and past surgical history.    Review of Systems   Constitutional: Negative for fatigue.   HENT: Negative for congestion.    Eyes: Negative for visual disturbance.   Respiratory: Negative for shortness of breath.    Cardiovascular: Negative for chest pain.   Gastrointestinal: Negative for blood in stool.   Endocrine: Negative.    Genitourinary: Negative.    Musculoskeletal: Negative for arthralgias.   Skin: Negative for rash.   Allergic/Immunologic: Negative for environmental allergies.   Neurological: Negative for headache.   Hematological: Does not bruise/bleed easily.   Psychiatric/Behavioral: The patient is not nervous/anxious.        Immunization History   Administered Date(s) Administered   • COVID-19 (MODERNA) BIVALENT BOOSTER 18+YRS 09/21/2022   • COVID-19 (PFIZER) PURPLE CAP 03/11/2021, 04/01/2021, 11/18/2021   • Covid-19 (Pfizer) Gray Cap 03/30/2022   • FluLaval/Fluzone >6mos 09/18/2020, 09/13/2021, 09/19/2022   • Hep A, 2 Dose 03/15/2018, 09/18/2018   • Hepatitis A 04/15/2018, 10/15/2018   • Influenza, Unspecified 09/12/2019   • Shingrix 10/17/2019, 12/22/2019   • Tdap 10/01/2015   • Zostavax 10/01/2016   • flucelvax quad pfs =>4 YRS 09/12/2019       Objective  "  Vitals:    09/26/22 0836   BP: 126/80   Pulse: 80   Resp: 14   Temp: 97.7 °F (36.5 °C)   Weight: 73 kg (161 lb)   Height: 170.2 cm (67\")     Body mass index is 25.22 kg/m².  Physical Exam  Vitals reviewed.   Constitutional:       Appearance: Normal appearance. She is well-developed.   HENT:      Head: Normocephalic and atraumatic.      Right Ear: Tympanic membrane, ear canal and external ear normal.      Left Ear: Tympanic membrane, ear canal and external ear normal.      Nose: Nose normal.      Mouth/Throat:      Mouth: Mucous membranes are moist.      Pharynx: Oropharynx is clear. No oropharyngeal exudate or posterior oropharyngeal erythema.   Eyes:      General: No scleral icterus.     Extraocular Movements: Extraocular movements intact.      Conjunctiva/sclera: Conjunctivae normal.      Pupils: Pupils are equal, round, and reactive to light.   Neck:      Thyroid: No thyromegaly.      Vascular: No carotid bruit.   Cardiovascular:      Rate and Rhythm: Normal rate and regular rhythm.      Heart sounds: Normal heart sounds. No murmur heard.  Pulmonary:      Effort: Pulmonary effort is normal.      Breath sounds: Normal breath sounds.   Abdominal:      General: Bowel sounds are normal. There is no distension.      Palpations: Abdomen is soft.      Tenderness: There is no abdominal tenderness.   Genitourinary:     Comments: Declines P/P.   Musculoskeletal:      Cervical back: Neck supple.      Comments: Ambulates without assistance; no clubbing, cyanosis or edema.    Lymphadenopathy:      Cervical: No cervical adenopathy.   Skin:     General: Skin is warm and dry.   Neurological:      Mental Status: She is alert.   Psychiatric:         Mood and Affect: Mood normal.         Behavior: Behavior normal.         Procedures    Assessment & Plan   Diagnoses and all orders for this visit:    1. Annual physical exam (Primary)  Comments:  Continue 150 minutes weekly aerobic exercise. Flu shot UTD. PVR-20 at age 64. Declines " P/P. MMG upcoming 11/10/2022.   Orders:  -     Comprehensive Metabolic Panel; Future  -     Lipid Panel With / Chol / HDL Ratio; Future    2. Moderate mixed hyperlipidemia not requiring statin therapy  Comments:  Improved with LSM.     3. Cervical cancer screening declined    Records reviewed include previous OV with myself as well as labs.     Return in about 1 year (around 9/26/2023) for Annual physical, Lab B4 FUP.

## 2022-09-26 ENCOUNTER — OFFICE VISIT (OUTPATIENT)
Dept: INTERNAL MEDICINE | Facility: CLINIC | Age: 62
End: 2022-09-26

## 2022-09-26 VITALS
SYSTOLIC BLOOD PRESSURE: 126 MMHG | WEIGHT: 161 LBS | HEART RATE: 80 BPM | RESPIRATION RATE: 14 BRPM | TEMPERATURE: 97.7 F | HEIGHT: 67 IN | DIASTOLIC BLOOD PRESSURE: 80 MMHG | BODY MASS INDEX: 25.27 KG/M2

## 2022-09-26 DIAGNOSIS — E78.2 MODERATE MIXED HYPERLIPIDEMIA NOT REQUIRING STATIN THERAPY: Chronic | ICD-10-CM

## 2022-09-26 DIAGNOSIS — Z53.20 CERVICAL CANCER SCREENING DECLINED: ICD-10-CM

## 2022-09-26 DIAGNOSIS — Z00.00 ANNUAL PHYSICAL EXAM: Primary | ICD-10-CM

## 2022-09-26 PROCEDURE — 99396 PREV VISIT EST AGE 40-64: CPT | Performed by: NURSE PRACTITIONER

## 2022-11-10 ENCOUNTER — HOSPITAL ENCOUNTER (OUTPATIENT)
Dept: MAMMOGRAPHY | Facility: HOSPITAL | Age: 62
Discharge: HOME | End: 2022-11-10
Admitting: NURSE PRACTITIONER

## 2022-11-10 DIAGNOSIS — Z12.31 VISIT FOR SCREENING MAMMOGRAM: ICD-10-CM

## 2022-11-10 PROCEDURE — 77067 SCR MAMMO BI INCL CAD: CPT

## 2022-11-10 PROCEDURE — 77063 BREAST TOMOSYNTHESIS BI: CPT

## 2023-09-21 ENCOUNTER — TRANSCRIBE ORDERS (OUTPATIENT)
Dept: ADMINISTRATIVE | Facility: HOSPITAL | Age: 63
End: 2023-09-21
Payer: COMMERCIAL

## 2023-09-21 DIAGNOSIS — Z12.31 SCREENING MAMMOGRAM FOR BREAST CANCER: Primary | ICD-10-CM

## 2023-09-21 DIAGNOSIS — Z00.00 ANNUAL PHYSICAL EXAM: ICD-10-CM

## 2023-09-22 LAB
ALBUMIN SERPL-MCNC: 4.8 G/DL (ref 3.5–5.2)
ALBUMIN/GLOB SERPL: 2 G/DL
ALP SERPL-CCNC: 92 U/L (ref 39–117)
ALT SERPL-CCNC: 14 U/L (ref 1–33)
AST SERPL-CCNC: 20 U/L (ref 1–32)
BILIRUB SERPL-MCNC: 0.4 MG/DL (ref 0–1.2)
BUN SERPL-MCNC: 16 MG/DL (ref 8–23)
BUN/CREAT SERPL: 19.5 (ref 7–25)
CALCIUM SERPL-MCNC: 9.8 MG/DL (ref 8.6–10.5)
CHLORIDE SERPL-SCNC: 103 MMOL/L (ref 98–107)
CHOLEST SERPL-MCNC: 222 MG/DL (ref 0–200)
CHOLEST/HDLC SERPL: 2.52 {RATIO}
CO2 SERPL-SCNC: 28.2 MMOL/L (ref 22–29)
CREAT SERPL-MCNC: 0.82 MG/DL (ref 0.57–1)
EGFRCR SERPLBLD CKD-EPI 2021: 80 ML/MIN/1.73
GLOBULIN SER CALC-MCNC: 2.4 GM/DL
GLUCOSE SERPL-MCNC: 106 MG/DL (ref 65–99)
HDLC SERPL-MCNC: 88 MG/DL (ref 40–60)
LDLC SERPL CALC-MCNC: 122 MG/DL (ref 0–100)
POTASSIUM SERPL-SCNC: 4.3 MMOL/L (ref 3.5–5.2)
PROT SERPL-MCNC: 7.2 G/DL (ref 6–8.5)
SODIUM SERPL-SCNC: 140 MMOL/L (ref 136–145)
TRIGL SERPL-MCNC: 71 MG/DL (ref 0–150)
VLDLC SERPL CALC-MCNC: 12 MG/DL (ref 5–40)

## 2023-09-26 NOTE — PROGRESS NOTES
Subjective   Chief Complaint   Patient presents with    Annual Exam       History of Present Illness   63 y.o. female presents for her annual physical. She is feeling good without complaints. MMG scheduled. Declines P/P. Right shoulder has been bothering her for a year. It sometimes feels like it sticks. Hand ache from time to time. Aleve intermittent use helps; uses it after outdoor work. Denies swelling ir erythema. She plays guitar (jazz and classical)--she does this for enjoyment and to stimulate her brain. Remains active walking 45-60 minutes daily.     CLS UTD with Dr. Manning with recall in 2025.      BMI is >= 25 and <30. (Overweight) The following options were offered after discussion;: exercise counseling/recommendations and nutrition counseling/recommendations       Patient Active Problem List   Diagnosis    Adenomatous polyp of colon    Moderate mixed hyperlipidemia not requiring statin therapy       No Known Allergies    Current Outpatient Medications on File Prior to Visit   Medication Sig Dispense Refill    Coenzyme Q10 (COQ10 PO) Take  by mouth Daily.      GINKGO BILOBA COMPLEX PO Take  by mouth.      glucosamine-chondroitin 500-400 MG capsule capsule Take 1 capsule by mouth Daily.      guaiFENesin (MUCINEX) 600 MG 12 hr tablet Take 2 tablets by mouth 2 (Two) Times a Day. As needed      Multiple Vitamins-Minerals (WOMENS MULTI VITAMIN & MINERAL PO) Take 1 tablet by mouth Daily.      Omega-3 Fatty Acids (fish oil) 1000 MG capsule capsule Take  by mouth Daily With Breakfast.      PSEUDOEPHEDRINE HCL ER PO Take 120 mg by mouth As Needed.      Triamcinolone Acetonide (NASACORT) 55 MCG/ACT nasal inhaler 2 sprays into the nostril(s) as directed by provider Daily.      TURMERIC PO Take 1 capsule by mouth Daily.      VITAMIN E PO Take 1 capsule by mouth Daily. Uses only in the winter for cheilitis.      Zinc 50 MG tablet Take  by mouth As Needed.      NON FORMULARY Qunol Blood pressure support       "[DISCONTINUED] Multiple Vitamins-Minerals (ZINC PO) Take 1 capsule by mouth Daily. During the winter cheilitis.       No current facility-administered medications on file prior to visit.       Past Medical History:   Diagnosis Date    Adenomatous polyp of colon 03/19/2021    Cataract     Ocular hypertension     Seasonal allergies        Family History   Problem Relation Age of Onset    Hypertension Mother     Diabetes Father     Breast cancer Neg Hx        Social History     Socioeconomic History    Marital status: Single   Tobacco Use    Smoking status: Never    Smokeless tobacco: Never   Substance and Sexual Activity    Alcohol use: No    Drug use: Not Currently    Sexual activity: Not Currently       Past Surgical History:   Procedure Laterality Date    ANKLE SURGERY      COLONOSCOPY      EYE PERIPHERAL IRIDOTOMY Bilateral 2016    Dr. Girish Sampson    FINGER SURGERY         The following portions of the patient's history were reviewed and updated as appropriate: problem list, allergies, current medications, past medical history, past family history, past social history, and past surgical history.    Review of Systems    Immunization History   Administered Date(s) Administered    COVID-19 (MODERNA) BIVALENT 12+YRS 09/21/2022    COVID-19 (PFIZER) Purple Cap Monovalent 03/11/2021, 04/01/2021, 11/18/2021    Covid-19 (Pfizer) Gray Cap Monovalent 03/30/2022    Fluzone (or Fluarix & Flulaval for VFC) >6mos 09/18/2020, 09/13/2021, 09/19/2022, 09/29/2023    Hep A, 2 Dose 03/15/2018, 09/18/2018    Hepatitis A 04/15/2018, 10/15/2018    Influenza, Unspecified 09/12/2019    Shingrix 10/17/2019, 12/22/2019    Tdap 10/01/2015    Zostavax 10/01/2016    flucelvax quad pfs =>4 YRS 09/12/2019       Objective   Vitals:    09/29/23 0850   BP: 142/80   Pulse: 80   Resp: 14   Weight: 75.5 kg (166 lb 6.4 oz)   Height: 170.2 cm (67.01\")     Body mass index is 26.06 kg/m².  Physical Exam  Vitals reviewed.   Constitutional:       Appearance: " Normal appearance. She is well-developed.   HENT:      Head: Normocephalic and atraumatic.      Right Ear: Tympanic membrane, ear canal and external ear normal.      Left Ear: Tympanic membrane, ear canal and external ear normal.      Nose: Nose normal.      Mouth/Throat:      Mouth: Mucous membranes are moist.      Pharynx: Oropharynx is clear. No oropharyngeal exudate or posterior oropharyngeal erythema.   Eyes:      General: No scleral icterus.     Extraocular Movements: Extraocular movements intact.      Conjunctiva/sclera: Conjunctivae normal.      Pupils: Pupils are equal, round, and reactive to light.   Neck:      Thyroid: No thyromegaly.      Vascular: No carotid bruit.   Cardiovascular:      Rate and Rhythm: Normal rate and regular rhythm.      Heart sounds: Normal heart sounds. No murmur heard.  Pulmonary:      Effort: Pulmonary effort is normal.      Breath sounds: Normal breath sounds.   Abdominal:      General: Bowel sounds are normal. There is no distension.      Palpations: Abdomen is soft.      Tenderness: There is no abdominal tenderness.   Musculoskeletal:      Cervical back: Neck supple.      Comments: Ambulates without assistance; no clubbing, cyanosis or edema.    Lymphadenopathy:      Cervical: No cervical adenopathy.   Skin:     General: Skin is warm and dry.   Neurological:      Mental Status: She is alert.   Psychiatric:         Mood and Affect: Mood normal.         Behavior: Behavior normal.       Procedures    Assessment & Plan   Diagnoses and all orders for this visit:    1. Annual physical exam (Primary)  Comments:  150 minutes weekly aerobic exercise advised. Flu shot today. Declines P/P. MMG scheduled. Repoeat DXA in 2024. PVR-20 in January at 63yo.    2. Elevated blood-pressure reading without diagnosis of hypertension  Comments:  Not at goal. Stop Qunol BP control. RTO 1 month to recheck BP. Continue LSM.    3. Impaired fasting glucose  Comments:  Check A1C with labs prior to  physical. Encouraged decreased CHOs and continue exercise.    4. Post-menopausal  Comments:  Schedule DXA in 6/2024.    5. Encounter for screening mammogram for malignant neoplasm of breast  Comments:  Scheduled in November.    6. Need for influenza vaccination  -     Fluzone (or Fluarix & Flulaval for VFC) >6mos    Records reviewed include previous OV with myself as well as labs.     Return in about 1 month (around 10/29/2023) for 30 min.

## 2023-09-29 ENCOUNTER — OFFICE VISIT (OUTPATIENT)
Dept: INTERNAL MEDICINE | Facility: CLINIC | Age: 63
End: 2023-09-29
Payer: COMMERCIAL

## 2023-09-29 VITALS
WEIGHT: 166.4 LBS | BODY MASS INDEX: 26.12 KG/M2 | DIASTOLIC BLOOD PRESSURE: 80 MMHG | HEIGHT: 67 IN | SYSTOLIC BLOOD PRESSURE: 142 MMHG | RESPIRATION RATE: 14 BRPM | HEART RATE: 80 BPM

## 2023-09-29 DIAGNOSIS — Z12.31 ENCOUNTER FOR SCREENING MAMMOGRAM FOR MALIGNANT NEOPLASM OF BREAST: ICD-10-CM

## 2023-09-29 DIAGNOSIS — R73.01 IMPAIRED FASTING GLUCOSE: ICD-10-CM

## 2023-09-29 DIAGNOSIS — Z00.00 ANNUAL PHYSICAL EXAM: Primary | ICD-10-CM

## 2023-09-29 DIAGNOSIS — Z23 NEED FOR INFLUENZA VACCINATION: ICD-10-CM

## 2023-09-29 DIAGNOSIS — R03.0 ELEVATED BLOOD-PRESSURE READING WITHOUT DIAGNOSIS OF HYPERTENSION: ICD-10-CM

## 2023-09-29 DIAGNOSIS — Z78.0 POST-MENOPAUSAL: ICD-10-CM

## 2023-10-19 NOTE — PROGRESS NOTES
Subjective   Chief Complaint   Patient presents with    Elevated Blood Pressure       History of Present Illness   63 y.o. female presents for follow up regarding elevated BP. She had been taking Sudafed, Ginkgo Biloba and Qunol BP control. She has since stopped these. HBP readings 120s/70s. Denies chest pain or dyspnea.      Patient Active Problem List   Diagnosis    Adenomatous polyp of colon    Moderate mixed hyperlipidemia not requiring statin therapy       No Known Allergies    Current Outpatient Medications on File Prior to Visit   Medication Sig Dispense Refill    glucosamine-chondroitin 500-400 MG capsule capsule Take 1 capsule by mouth Daily.      guaiFENesin (MUCINEX) 600 MG 12 hr tablet Take 2 tablets by mouth 2 (Two) Times a Day. As needed      Multiple Vitamins-Minerals (WOMENS MULTI VITAMIN & MINERAL PO) Take 1 tablet by mouth Daily.      Omega-3 Fatty Acids (fish oil) 1000 MG capsule capsule Take  by mouth Daily With Breakfast.      Triamcinolone Acetonide (NASACORT) 55 MCG/ACT nasal inhaler 2 sprays into the nostril(s) as directed by provider Daily.      TURMERIC PO Take 1 capsule by mouth Daily.      VITAMIN E PO Take 1 capsule by mouth Daily. Uses only in the winter for cheilitis.      Zinc 50 MG tablet Take  by mouth As Needed.      [DISCONTINUED] Coenzyme Q10 (COQ10 PO) Take  by mouth Daily. (Patient not taking: Reported on 10/27/2023)      [DISCONTINUED] GINKGO BILOBA COMPLEX PO Take  by mouth. (Patient not taking: Reported on 10/27/2023)      [DISCONTINUED] NON FORMULARY Qunol Blood pressure support (Patient not taking: Reported on 10/27/2023)      [DISCONTINUED] PSEUDOEPHEDRINE HCL ER PO Take 120 mg by mouth As Needed.       No current facility-administered medications on file prior to visit.       Past Medical History:   Diagnosis Date    Adenomatous polyp of colon 03/19/2021    Cataract     Ocular hypertension     Seasonal allergies        Family History   Problem Relation Age of Onset     "Hypertension Mother     Diabetes Father     Breast cancer Neg Hx        Social History     Socioeconomic History    Marital status: Single   Tobacco Use    Smoking status: Never    Smokeless tobacco: Never   Substance and Sexual Activity    Alcohol use: No    Drug use: Not Currently    Sexual activity: Not Currently       Past Surgical History:   Procedure Laterality Date    ANKLE SURGERY      COLONOSCOPY      EYE PERIPHERAL IRIDOTOMY Bilateral 2016    Dr. Girish Sampson    FINGER SURGERY         The following portions of the patient's history were reviewed and updated as appropriate: problem list, allergies, current medications, past medical history, and past social history.    Review of Systems    Immunization History   Administered Date(s) Administered    COVID-19 (MODERNA) BIVALENT 12+YRS 09/21/2022    COVID-19 (PFIZER) Purple Cap Monovalent 03/11/2021, 04/01/2021, 11/18/2021    COVID-19 F23 (MODERNA) 12YRS+ (SPIKEVAX) 10/03/2023    Covid-19 (Pfizer) Gray Cap Monovalent 03/30/2022    Fluzone (or Fluarix & Flulaval for VFC) >6mos 09/18/2020, 09/13/2021, 09/19/2022, 09/29/2023    Hep A, 2 Dose 03/15/2018, 09/18/2018    Hepatitis A 04/15/2018, 10/15/2018    Influenza, Unspecified 09/12/2019    Shingrix 10/17/2019, 12/22/2019    Tdap 10/01/2015    Zostavax 10/01/2016    flucelvax quad pfs =>4 YRS 09/12/2019       Objective   Vitals:    10/27/23 1045   BP: 128/78   Pulse: 72   Resp: 12   Temp: 98.9 °F (37.2 °C)   Weight: 75.3 kg (166 lb)   Height: 170.2 cm (67.01\")     Body mass index is 25.99 kg/m².  Physical Exam  Vitals reviewed.   Constitutional:       Appearance: Normal appearance. She is well-developed.   HENT:      Head: Normocephalic and atraumatic.   Cardiovascular:      Rate and Rhythm: Normal rate and regular rhythm.      Heart sounds: Normal heart sounds, S1 normal and S2 normal.   Pulmonary:      Effort: Pulmonary effort is normal.      Breath sounds: Normal breath sounds.   Skin:     General: Skin is warm " and dry.   Neurological:      Mental Status: She is alert.   Psychiatric:         Mood and Affect: Mood normal.         Behavior: Behavior normal.         Procedures    Assessment & Plan   Diagnoses and all orders for this visit:    1. Elevated BP without diagnosis of hypertension (Primary)  Comments:  Resolved since stopping OTC supplement detailed in [facility] an Sudafed. HBP 2-3x week and call if readings greater than 130/80s.    Records reviewed include previous OV with myself as well as labs.     Return for [facility], Lab B4 FUP.

## 2023-10-27 ENCOUNTER — OFFICE VISIT (OUTPATIENT)
Dept: INTERNAL MEDICINE | Facility: CLINIC | Age: 63
End: 2023-10-27
Payer: COMMERCIAL

## 2023-10-27 VITALS
HEIGHT: 67 IN | RESPIRATION RATE: 12 BRPM | HEART RATE: 72 BPM | BODY MASS INDEX: 26.06 KG/M2 | WEIGHT: 166 LBS | SYSTOLIC BLOOD PRESSURE: 128 MMHG | TEMPERATURE: 98.9 F | DIASTOLIC BLOOD PRESSURE: 78 MMHG

## 2023-10-27 DIAGNOSIS — R03.0 ELEVATED BP WITHOUT DIAGNOSIS OF HYPERTENSION: Primary | ICD-10-CM

## 2023-10-27 PROCEDURE — 99212 OFFICE O/P EST SF 10 MIN: CPT | Performed by: NURSE PRACTITIONER

## 2023-11-17 ENCOUNTER — HOSPITAL ENCOUNTER (OUTPATIENT)
Dept: MAMMOGRAPHY | Facility: HOSPITAL | Age: 63
Discharge: HOME | End: 2023-11-17
Admitting: NURSE PRACTITIONER
Payer: COMMERCIAL

## 2023-11-17 DIAGNOSIS — Z12.31 ENCOUNTER FOR SCREENING MAMMOGRAM FOR MALIGNANT NEOPLASM OF BREAST: Primary | ICD-10-CM

## 2023-11-17 DIAGNOSIS — Z12.31 SCREENING MAMMOGRAM FOR BREAST CANCER: ICD-10-CM

## 2023-11-17 PROCEDURE — 77067 SCR MAMMO BI INCL CAD: CPT

## 2023-11-17 PROCEDURE — 77063 BREAST TOMOSYNTHESIS BI: CPT

## 2024-04-25 ENCOUNTER — TELEPHONE (OUTPATIENT)
Dept: INTERNAL MEDICINE | Facility: CLINIC | Age: 64
End: 2024-04-25
Payer: MEDICARE

## 2024-04-25 DIAGNOSIS — J02.9 SORE THROAT: Primary | ICD-10-CM

## 2024-04-25 RX ORDER — BENZONATATE 100 MG/1
100 CAPSULE ORAL 3 TIMES DAILY PRN
Qty: 30 CAPSULE | Refills: 0 | Status: SHIPPED | OUTPATIENT
Start: 2024-04-25

## 2024-09-16 ENCOUNTER — OFFICE VISIT (OUTPATIENT)
Dept: FAMILY MEDICINE CLINIC | Facility: CLINIC | Age: 64
End: 2024-09-16
Payer: MEDICARE

## 2024-09-16 VITALS
DIASTOLIC BLOOD PRESSURE: 78 MMHG | WEIGHT: 176 LBS | SYSTOLIC BLOOD PRESSURE: 130 MMHG | BODY MASS INDEX: 27.62 KG/M2 | RESPIRATION RATE: 17 BRPM | HEART RATE: 90 BPM | HEIGHT: 67 IN | OXYGEN SATURATION: 97 %

## 2024-09-16 DIAGNOSIS — M25.572 ACUTE BILATERAL ANKLE PAIN: Primary | ICD-10-CM

## 2024-09-16 DIAGNOSIS — M25.571 ACUTE BILATERAL ANKLE PAIN: Primary | ICD-10-CM

## 2024-09-16 PROCEDURE — 1126F AMNT PAIN NOTED NONE PRSNT: CPT | Performed by: FAMILY MEDICINE

## 2024-09-16 PROCEDURE — 99213 OFFICE O/P EST LOW 20 MIN: CPT | Performed by: FAMILY MEDICINE

## 2024-09-16 NOTE — PROGRESS NOTES
"Chief Complaint  Chief Complaint   Patient presents with    Establish Care     New Pt    Joint Swelling     Pt c/o on/off ankle swelling     Immunizations     Pt here to discuss covid/flu/tdap vaccines       Subjective    History of Present Illness        Yun Perry presents to [facility] for   History of Present Illness  The patient is a 64-year-old female who presents for evaluation of multiple medical concerns.    She reports experiencing discomfort due to fatty tissue in her ankle, as diagnosed by her podiatrist. This issue has been causing difficulty in walking, with the sensation of crunching tissue and bone. The discomfort seems to increase with exercise. Despite her efforts to lose weight, she has noticed a slight gain. She has tried using inserts and wearing high-top shoes at work, but these measures have not provided relief. She occasionally takes Aleve for the pain. She also mentions a past diagnosis of a Baker's cyst on the back of her leg, which was expected to be absorbed by her body. 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    She expresses interest in receiving the COVID-19 and influenza vaccines, as well as the tetanus vaccine, which she believes is due this year. She is unsure about the need for the RSV vaccine given her age. She does not take any regular medications and does not consider herself to be at high risk. She recalls receiving a shingles vaccine in the past, but is uncertain about the details. She has an upcoming appointment scheduled for 10/14/2024.  Joint Swelling  Associated symptoms include joint swelling.      History of Present Illness      Objective   Vital Signs:   Visit Vitals  /78   Pulse 90   Resp 17   Ht 170.2 cm (67\")   Wt 79.8 kg (176 lb)   SpO2 97%   BMI 27.57 kg/m²                Physical Exam  Vitals reviewed.   Constitutional:       Appearance: She is " well-developed.   HENT:      Head: Normocephalic.      Right Ear: External ear normal.      Left Ear: External ear normal.      Nose: Nose normal.   Eyes:      Conjunctiva/sclera: Conjunctivae normal.   Cardiovascular:      Rate and Rhythm: Normal rate and regular rhythm.   Pulmonary:      Effort: Pulmonary effort is normal.      Breath sounds: Normal breath sounds.   Musculoskeletal:         General: Normal range of motion.      Cervical back: Normal range of motion and neck supple.   Skin:     General: Skin is warm and dry.      Capillary Refill: Capillary refill takes less than 2 seconds.   Neurological:      Mental Status: She is alert and oriented to person, place, and time.        Physical Exam           Result Review :  Results                            Assessment and Plan      Diagnoses and all orders for this visit:    1. Acute bilateral ankle pain (Primary)  Assessment & Plan:  Worsening.   The ankle discomfort could be attributed to wear and tear, exacerbated by increased activity. The possibility of a steroid injection by the podiatrist was discussed. The use of supportive footwear or an ankle brace was suggested. Over-the-counter pain relievers such as Tylenol or ibuprofen were recommended for use as needed.         Assessment & Plan             Follow Up   No follow-ups on file.  Patient was given instructions and counseling regarding her condition or for health maintenance advice. Please see specific information pulled into the AVS if appropriate.     Patient or patient representative verbalized consent for the use of Ambient Listening during the visit with  Dexter Hernandez Sr, MD for chart documentation. 9/30/2024  01:31 EDT

## 2024-09-17 ENCOUNTER — PATIENT ROUNDING (BHMG ONLY) (OUTPATIENT)
Dept: FAMILY MEDICINE CLINIC | Facility: CLINIC | Age: 64
End: 2024-09-17
Payer: MEDICARE

## 2024-09-30 PROBLEM — M25.571 ACUTE BILATERAL ANKLE PAIN: Status: ACTIVE | Noted: 2024-09-30

## 2024-09-30 PROBLEM — M25.572 ACUTE BILATERAL ANKLE PAIN: Status: ACTIVE | Noted: 2024-09-30

## 2024-09-30 NOTE — ASSESSMENT & PLAN NOTE
Worsening.   The ankle discomfort could be attributed to wear and tear, exacerbated by increased activity. The possibility of a steroid injection by the podiatrist was discussed. The use of supportive footwear or an ankle brace was suggested. Over-the-counter pain relievers such as Tylenol or ibuprofen were recommended for use as needed.

## 2024-10-07 ENCOUNTER — TELEPHONE (OUTPATIENT)
Dept: FAMILY MEDICINE CLINIC | Facility: CLINIC | Age: 64
End: 2024-10-07
Payer: MEDICARE

## 2024-10-07 DIAGNOSIS — E55.9 VITAMIN D DEFICIENCY: ICD-10-CM

## 2024-10-07 DIAGNOSIS — E78.2 MODERATE MIXED HYPERLIPIDEMIA NOT REQUIRING STATIN THERAPY: Primary | Chronic | ICD-10-CM

## 2024-10-08 LAB
25(OH)D3+25(OH)D2 SERPL-MCNC: 53.7 NG/ML (ref 30–100)
ALBUMIN SERPL-MCNC: 4.5 G/DL (ref 3.5–5.2)
ALBUMIN/GLOB SERPL: 2 G/DL
ALP SERPL-CCNC: 99 U/L (ref 39–117)
ALT SERPL-CCNC: 25 U/L (ref 1–33)
APPEARANCE UR: CLEAR
AST SERPL-CCNC: 40 U/L (ref 1–32)
BACTERIA #/AREA URNS HPF: NORMAL /[HPF]
BASOPHILS # BLD AUTO: 0.02 10*3/MM3 (ref 0–0.2)
BASOPHILS NFR BLD AUTO: 0.4 % (ref 0–1.5)
BILIRUB SERPL-MCNC: 0.5 MG/DL (ref 0–1.2)
BILIRUB UR QL STRIP: NEGATIVE
BUN SERPL-MCNC: 16 MG/DL (ref 8–23)
BUN/CREAT SERPL: 19.8 (ref 7–25)
CALCIUM SERPL-MCNC: 9.1 MG/DL (ref 8.6–10.5)
CASTS URNS QL MICRO: NORMAL /LPF
CHLORIDE SERPL-SCNC: 104 MMOL/L (ref 98–107)
CHOLEST SERPL-MCNC: 229 MG/DL (ref 0–200)
CO2 SERPL-SCNC: 26.4 MMOL/L (ref 22–29)
COLOR UR: YELLOW
CREAT SERPL-MCNC: 0.81 MG/DL (ref 0.57–1)
EGFRCR SERPLBLD CKD-EPI 2021: 80.7 ML/MIN/1.73
EOSINOPHIL # BLD AUTO: 0.16 10*3/MM3 (ref 0–0.4)
EOSINOPHIL NFR BLD AUTO: 3 % (ref 0.3–6.2)
EPI CELLS #/AREA URNS HPF: NORMAL /HPF (ref 0–10)
ERYTHROCYTE [DISTWIDTH] IN BLOOD BY AUTOMATED COUNT: 12.4 % (ref 12.3–15.4)
GLOBULIN SER CALC-MCNC: 2.2 GM/DL
GLUCOSE SERPL-MCNC: 99 MG/DL (ref 65–99)
GLUCOSE UR QL STRIP: NEGATIVE
HCT VFR BLD AUTO: 38.5 % (ref 34–46.6)
HDLC SERPL-MCNC: 101 MG/DL (ref 40–60)
HGB BLD-MCNC: 12.5 G/DL (ref 12–15.9)
HGB UR QL STRIP: NEGATIVE
IMM GRANULOCYTES # BLD AUTO: 0.01 10*3/MM3 (ref 0–0.05)
IMM GRANULOCYTES NFR BLD AUTO: 0.2 % (ref 0–0.5)
KETONES UR QL STRIP: NEGATIVE
LDLC SERPL CALC-MCNC: 116 MG/DL (ref 0–100)
LDLC/HDLC SERPL: 1.12 {RATIO}
LEUKOCYTE ESTERASE UR QL STRIP: NEGATIVE
LYMPHOCYTES # BLD AUTO: 1.33 10*3/MM3 (ref 0.7–3.1)
LYMPHOCYTES NFR BLD AUTO: 25.1 % (ref 19.6–45.3)
MCH RBC QN AUTO: 29.1 PG (ref 26.6–33)
MCHC RBC AUTO-ENTMCNC: 32.5 G/DL (ref 31.5–35.7)
MCV RBC AUTO: 89.5 FL (ref 79–97)
MICRO URNS: NORMAL
MICRO URNS: NORMAL
MONOCYTES # BLD AUTO: 0.5 10*3/MM3 (ref 0.1–0.9)
MONOCYTES NFR BLD AUTO: 9.4 % (ref 5–12)
NEUTROPHILS # BLD AUTO: 3.28 10*3/MM3 (ref 1.7–7)
NEUTROPHILS NFR BLD AUTO: 61.9 % (ref 42.7–76)
NITRITE UR QL STRIP: NEGATIVE
NRBC BLD AUTO-RTO: 0 /100 WBC (ref 0–0.2)
PH UR STRIP: 5.5 [PH] (ref 5–7.5)
PLATELET # BLD AUTO: 294 10*3/MM3 (ref 140–450)
POTASSIUM SERPL-SCNC: 4.3 MMOL/L (ref 3.5–5.2)
PROT SERPL-MCNC: 6.7 G/DL (ref 6–8.5)
PROT UR QL STRIP: NEGATIVE
RBC # BLD AUTO: 4.3 10*6/MM3 (ref 3.77–5.28)
RBC #/AREA URNS HPF: NORMAL /HPF (ref 0–2)
SODIUM SERPL-SCNC: 141 MMOL/L (ref 136–145)
SP GR UR STRIP: 1.02 (ref 1–1.03)
T4 FREE SERPL-MCNC: 1.2 NG/DL (ref 0.92–1.68)
TRIGL SERPL-MCNC: 73 MG/DL (ref 0–150)
TSH SERPL DL<=0.005 MIU/L-ACNC: 4.32 UIU/ML (ref 0.27–4.2)
URINALYSIS REFLEX: NORMAL
UROBILINOGEN UR STRIP-MCNC: 0.2 MG/DL (ref 0.2–1)
VLDLC SERPL CALC-MCNC: 12 MG/DL (ref 5–40)
WBC # BLD AUTO: 5.3 10*3/MM3 (ref 3.4–10.8)
WBC #/AREA URNS HPF: NORMAL /HPF (ref 0–5)

## 2024-10-16 ENCOUNTER — OFFICE VISIT (OUTPATIENT)
Dept: FAMILY MEDICINE CLINIC | Facility: CLINIC | Age: 64
End: 2024-10-16
Payer: MEDICARE

## 2024-10-16 VITALS
HEIGHT: 67 IN | SYSTOLIC BLOOD PRESSURE: 124 MMHG | BODY MASS INDEX: 27.31 KG/M2 | OXYGEN SATURATION: 98 % | DIASTOLIC BLOOD PRESSURE: 82 MMHG | WEIGHT: 174 LBS | HEART RATE: 78 BPM

## 2024-10-16 DIAGNOSIS — Z00.00 WELCOME TO MEDICARE PREVENTIVE VISIT: Primary | ICD-10-CM

## 2024-10-16 DIAGNOSIS — R73.03 PREDIABETES: ICD-10-CM

## 2024-10-16 DIAGNOSIS — R74.8 ELEVATED LIVER ENZYMES: ICD-10-CM

## 2024-10-16 DIAGNOSIS — R79.89 ELEVATED TSH: ICD-10-CM

## 2024-10-16 PROCEDURE — 1170F FXNL STATUS ASSESSED: CPT | Performed by: NURSE PRACTITIONER

## 2024-10-16 PROCEDURE — G0402 INITIAL PREVENTIVE EXAM: HCPCS | Performed by: NURSE PRACTITIONER

## 2024-10-16 PROCEDURE — 1126F AMNT PAIN NOTED NONE PRSNT: CPT | Performed by: NURSE PRACTITIONER

## 2024-10-16 NOTE — PROGRESS NOTES
Subjective   The ABCs of the Annual Wellness Visit  Medicare Wellness Visit      Yun Perry is a 64 y.o. patient who presents for a Medicare Wellness Visit.    The following portions of the patient's history were reviewed and   updated as appropriate: allergies, current medications, past family history, past medical history, past social history, past surgical history, and problem list.    Compared to one year ago, the patient's physical   health is the same.  Compared to one year ago, the patient's mental   health is the same.    Recent Hospitalizations:  She was not admitted to the hospital during the last year.     Current Medical Providers:  Patient Care Team:  Dexter Hernandez Sr., MD as PCP - General (Family Medicine)  Jc Manning MD as Consulting Physician (Gastroenterology)    Outpatient Medications Prior to Visit   Medication Sig Dispense Refill    glucosamine-chondroitin 500-400 MG capsule capsule Take 1 capsule by mouth Daily.      Multiple Vitamins-Minerals (WOMENS MULTI VITAMIN & MINERAL PO) Take 1 tablet by mouth Daily.      Omega-3 Fatty Acids (fish oil) 1000 MG capsule capsule Take  by mouth Daily With Breakfast.      Triamcinolone Acetonide (NASACORT) 55 MCG/ACT nasal inhaler Administer 2 sprays into the nostril(s) as directed by provider Daily.      TURMERIC PO Take 1 capsule by mouth Daily.      VITAMIN E PO Take 1 capsule by mouth Daily. Uses only in the winter for cheilitis.      Zinc 50 MG tablet Take  by mouth As Needed.       No facility-administered medications prior to visit.     No opioid medication identified on active medication list. I have reviewed chart for other potential  high risk medication/s and harmful drug interactions in the elderly.      Aspirin is not on active medication list.  Aspirin use is not indicated based on review of current medical condition/s. Risk of harm outweighs potential benefits.  .    Patient Active Problem List   Diagnosis    Adenomatous  "polyp of colon    Moderate mixed hyperlipidemia not requiring statin therapy    Acute bilateral ankle pain     Advance Care Planning Advance Directive is not on file.  ACP discussion was held with the patient during this visit. Patient does not have an advance directive, information provided.            Objective   Vitals:    10/16/24 1439   BP: 124/82   BP Location: Right arm   Patient Position: Sitting   Cuff Size: Large Adult   Pulse: 78   SpO2: 98%   Weight: 78.9 kg (174 lb)   Height: 170.2 cm (67\")       Estimated body mass index is 27.25 kg/m² as calculated from the following:    Height as of this encounter: 170.2 cm (67\").    Weight as of this encounter: 78.9 kg (174 lb).    BMI is >= 25 and <30. (Overweight) The following options were offered after discussion;: weight loss educational material (shared in after visit summary) and exercise counseling/recommendations       Does the patient have evidence of cognitive impairment? No  Lab Results   Component Value Date    CHLPL 229 (H) 10/07/2024    TRIG 73 10/07/2024     (H) 10/07/2024     (H) 10/07/2024    VLDL 12 10/07/2024                                                                                               Health  Risk Assessment    Smoking Status:  Social History     Tobacco Use   Smoking Status Never   Smokeless Tobacco Never     Alcohol Consumption:  Social History     Substance and Sexual Activity   Alcohol Use No       Fall Risk Screen  STEADI Fall Risk Assessment was completed, and patient is at LOW risk for falls.Assessment completed on:10/16/2024    Depression Screening:      10/16/2024     2:40 PM   PHQ-2/PHQ-9 Depression Screening   Little interest or pleasure in doing things Not at all   Feeling down, depressed, or hopeless Not at all     Health Habits and Functional and Cognitive Screening:      10/16/2024     2:00 PM   Functional & Cognitive Status   Do you have difficulty preparing food and eating? No   Do you have " difficulty bathing yourself, getting dressed or grooming yourself? No   Do you have difficulty using the toilet? No   Do you have difficulty moving around from place to place? No   Do you have trouble with steps or getting out of a bed or a chair? No   Current Diet Limited Junk Food   Dental Exam Up to date   Eye Exam Up to date   Exercise (times per week) 7 times per week   Current Exercises Include Walking;Other;Home Fitness Gym   Do you need help using the phone?  No   Are you deaf or do you have serious difficulty hearing?  No   Do you need help to go to places out of walking distance? No   Do you need help shopping? No   Do you need help preparing meals?  No   Do you need help with housework?  No   Do you need help with laundry? No   Do you need help taking your medications? No   Do you need help managing money? No   Do you ever drive or ride in a car without wearing a seat belt? No   Have you felt unusual stress, anger or loneliness in the last month? No   Who do you live with? Alone   If you need help, do you have trouble finding someone available to you? No   Have you been bothered in the last four weeks by sexual problems? No   Do you have difficulty concentrating, remembering or making decisions? No           Age-appropriate Screening Schedule:  Refer to the list below for future screening recommendations based on patient's age, sex and/or medical conditions. Orders for these recommended tests are listed in the plan section. The patient has been provided with a written plan.    Health Maintenance List  Health Maintenance   Topic Date Due    Pneumococcal Vaccine 65+ (1 of 1 - PCV) Never done    DXA SCAN  06/07/2024    COVID-19 Vaccine (7 - 2023-24 season) 09/01/2024    BMI FOLLOWUP  09/29/2024    LIPID PANEL  10/07/2025    ANNUAL WELLNESS VISIT  10/16/2025    MAMMOGRAM  11/17/2025    COLORECTAL CANCER SCREENING  11/11/2030    TDAP/TD VACCINES (3 - Td or Tdap) 10/07/2034    HEPATITIS C SCREENING  Completed     INFLUENZA VACCINE  Completed    ZOSTER VACCINE  Completed    PAP SMEAR  Discontinued                                                                                                                                                CMS Preventative Services Quick Reference  Risk Factors Identified During Encounter  Immunizations Discussed/Encouraged: Prevnar 20 (Pneumococcal 20-valent conjugate) and COVID19    The above risks/problems have been discussed with the patient.  Pertinent information has been shared with the patient in the After Visit Summary.  An After Visit Summary and PPPS were made available to the patient.    Follow Up:   Next Medicare Wellness visit to be scheduled in 1 year.   Physical Exam  Vitals reviewed.   Constitutional:       General: She is not in acute distress.     Appearance: She is well-developed. She is not diaphoretic.   HENT:      Head: Normocephalic and atraumatic.      Right Ear: Tympanic membrane, ear canal and external ear normal.      Left Ear: Tympanic membrane, ear canal and external ear normal.      Nose: Nose normal.      Mouth/Throat:      Mouth: Mucous membranes are moist.      Pharynx: Oropharynx is clear. Uvula midline. No oropharyngeal exudate.   Eyes:      Conjunctiva/sclera: Conjunctivae normal.      Pupils: Pupils are equal, round, and reactive to light.   Cardiovascular:      Rate and Rhythm: Normal rate and regular rhythm.      Heart sounds: Normal heart sounds. No murmur heard.     No friction rub. No gallop.   Pulmonary:      Effort: Pulmonary effort is normal. No respiratory distress.      Breath sounds: Normal breath sounds. No wheezing or rales.   Abdominal:      General: Bowel sounds are normal. There is no distension.      Palpations: Abdomen is soft.      Tenderness: There is no abdominal tenderness.   Musculoskeletal:      Cervical back: Neck supple.   Lymphadenopathy:      Cervical: No cervical adenopathy.   Skin:     General: Skin is warm and dry.    Neurological:      Mental Status: She is alert and oriented to person, place, and time.   Psychiatric:         Mood and Affect: Mood normal.         Assessment & Plan  Welcome to Medicare preventive visit    Elevated TSH    Elevated liver enzymes    Prediabetes      Orders Placed This Encounter   Procedures    TSH Rfx On Abnormal To Free T4    Comprehensive metabolic panel             Follow Up:   No follow-ups on file.

## 2024-10-29 ENCOUNTER — TELEPHONE (OUTPATIENT)
Dept: FAMILY MEDICINE CLINIC | Facility: CLINIC | Age: 64
End: 2024-10-29

## 2024-10-29 NOTE — TELEPHONE ENCOUNTER
Hub staff attempted to follow warm transfer process and was unsuccessful     Caller:     Relationship to patient:     Best call back number: [phone]     Patient is needing: PATIENT RETURNING CALL FROM OFFICE.

## 2024-11-06 ENCOUNTER — TRANSCRIBE ORDERS (OUTPATIENT)
Dept: ADMINISTRATIVE | Facility: HOSPITAL | Age: 64
End: 2024-11-06
Payer: MEDICARE

## 2024-11-06 DIAGNOSIS — Z12.31 VISIT FOR SCREENING MAMMOGRAM: Primary | ICD-10-CM

## 2024-12-10 ENCOUNTER — TELEPHONE (OUTPATIENT)
Dept: FAMILY MEDICINE CLINIC | Facility: CLINIC | Age: 64
End: 2024-12-10

## 2024-12-10 NOTE — TELEPHONE ENCOUNTER
Hub staff attempted to follow warm transfer process and was unsuccessful     Caller: Yun Perry    Relationship to patient: Self    Best call back number: [phone]     Patient is needing: RETURNING PHONE CALL TO JIM FOR RESULTS

## 2025-02-24 ENCOUNTER — HOSPITAL ENCOUNTER (OUTPATIENT)
Dept: MAMMOGRAPHY | Facility: HOSPITAL | Age: 65
Discharge: HOME | End: 2025-02-24
Admitting: FAMILY MEDICINE
Payer: MEDICARE

## 2025-02-24 DIAGNOSIS — Z12.31 VISIT FOR SCREENING MAMMOGRAM: ICD-10-CM

## 2025-02-24 PROCEDURE — 77067 SCR MAMMO BI INCL CAD: CPT

## 2025-02-24 PROCEDURE — 77063 BREAST TOMOSYNTHESIS BI: CPT

## 2025-02-27 ENCOUNTER — TELEPHONE (OUTPATIENT)
Dept: FAMILY MEDICINE CLINIC | Facility: CLINIC | Age: 65
End: 2025-02-27

## 2025-02-27 NOTE — TELEPHONE ENCOUNTER
Caller: Yun Perry    Relationship: Self    Best call back number: [phone]    What is the best time to reach you: ANY    Who are you requesting to speak with (clinical staff, provider,  specific staff member): CLINICAL    Do you know the name of the person who called: N/A    What was the call regarding: PATIENT WAS CALLING IN TO DISCUSS IF SHE SHOULD GET THE MEASLES BOOSTER SHOT, BECAUSE IT WAS SUGGEST IF YOU WERE BORN IN 1960 AND UP TO GET A BOOSTER SHOT.     Is it okay if the provider responds through MyChart: N/A

## 2025-03-03 ENCOUNTER — TELEPHONE (OUTPATIENT)
Dept: FAMILY MEDICINE CLINIC | Facility: CLINIC | Age: 65
End: 2025-03-03
Payer: MEDICARE